# Patient Record
Sex: FEMALE | Race: BLACK OR AFRICAN AMERICAN | Employment: FULL TIME | URBAN - METROPOLITAN AREA
[De-identification: names, ages, dates, MRNs, and addresses within clinical notes are randomized per-mention and may not be internally consistent; named-entity substitution may affect disease eponyms.]

---

## 2017-05-01 ENCOUNTER — OFFICE VISIT (OUTPATIENT)
Dept: OBGYN | Facility: CLINIC | Age: 32
End: 2017-05-01
Payer: COMMERCIAL

## 2017-05-01 VITALS
WEIGHT: 144.7 LBS | DIASTOLIC BLOOD PRESSURE: 76 MMHG | OXYGEN SATURATION: 99 % | HEIGHT: 65 IN | HEART RATE: 78 BPM | BODY MASS INDEX: 24.11 KG/M2 | SYSTOLIC BLOOD PRESSURE: 110 MMHG

## 2017-05-01 DIAGNOSIS — N83.9 PROBLEMS WITH OVULATION: ICD-10-CM

## 2017-05-01 DIAGNOSIS — Z31.69 GENERAL COUNSELING AND ADVICE FOR PROCREATIVE MANAGEMENT: Primary | ICD-10-CM

## 2017-05-01 DIAGNOSIS — Z12.4 SCREENING FOR MALIGNANT NEOPLASM OF CERVIX: ICD-10-CM

## 2017-05-01 PROCEDURE — G0145 SCR C/V CYTO,THINLAYER,RESCR: HCPCS | Performed by: OBSTETRICS & GYNECOLOGY

## 2017-05-01 PROCEDURE — G0476 HPV COMBO ASSAY CA SCREEN: HCPCS | Performed by: OBSTETRICS & GYNECOLOGY

## 2017-05-01 PROCEDURE — 99204 OFFICE O/P NEW MOD 45 MIN: CPT | Performed by: OBSTETRICS & GYNECOLOGY

## 2017-05-01 RX ORDER — ALBUTEROL SULFATE 90 UG/1
2 AEROSOL, METERED RESPIRATORY (INHALATION)
COMMUNITY
Start: 2016-01-02 | End: 2019-08-26

## 2017-05-01 RX ORDER — DIPHENHYDRAMINE HCL 25 MG
25 CAPSULE ORAL
COMMUNITY
Start: 2016-01-02

## 2017-05-01 NOTE — PATIENT INSTRUCTIONS
- Please contact the clinic on the first day of your next period. You will need to return to obtain labs on day 3 of your period.   - Please keep a diary of the first day of each period and the timing of any intercourse  - Plan to have intercourse every other day from day 10-20 of your cycle if possible  - Start taking prenatal vitamins now    - your  needs to see a Urologist for semen analysis and evaluation of erectile dysfunction (inability to achieve erection) and his primary care doctor to evaluate his general health    You can call [place] at [phone] or [place] at [phone].       You may use the following products to ease constipation:    1. Stool softeners such as metamucil or benefiber  2. senna 1-2 times daily  3. Fiber supplements  4. miralax (over the counter).  5. Dulcolax    Please be sure to keep adequately hydrated; 6-8 8oz glasses daily, more if needed to compensate for exercise, sweating, etc.      More specific dosing can be found below:    - Metamucil 28g daily PO with 8oz of water  - senna-docusate (SENOKOT-S;PERICOLACE) 8.6-50 MG per tablet PO 1 tablet, Oral, 2 TIMES DAILY, Start with 1 tablet PO BID, reduce to 1 tablet daily when having daily BMs. Stop for loose stools.  - docusate sodium (COLACE) capsule 100 mg, Oral, 2 TIMES DAILY, To prevent constipation. Hold for loose stools.  - bisacodyl (DULCOLAX) suppository 10 mg, Rectal, DAILY PRN, constipation, Hold for loose stools.

## 2017-05-01 NOTE — PROGRESS NOTES
"SUBJECTIVE:                                                   Becky Mock is a 32 year old  female who presents to clinic today for fertility discussion. Becky has been  since  but living apart from her  until 2015 at which point they both arrived in the [place]. She is interested in conception. She and her  have very little intercourse, roughly every 3 months. He has diabetes and she reports he is unable to obtain an erection.     - Patient's last menstrual period was 2017 (approximate).  Periods last 2-4 days.  unknown  - She reports \"regular periods\" but states they come every 2-4 weeks.  - Uses 2-3 pads on heavy days, 1 pad on light days  - Cyclic symptoms: breast tenderness, moodiness, back pain, uterine cramping.    Problem list and histories reviewed & adjusted, as indicated.  Additional history: as documented.    ROS:  Const: feels like she has lost some weight recently, has not been weighing herself, no fever, chills  : no dysuria, hematuria, urinary frequency, urgency, hesitancy  GI: + constipation, up to 2 weeks without a bowel movement, no diarrhea, abdominal pain  Breast: no nipple discharge, skin changes, lumps  Heme: no history blood clots or easy bruising/bleeding  Neuro: occasional headaches, no Hx migraine, no aura  Endo: + heat or cold intolerance and fatigue  Psych: mood stable, + anxiety, depression  CV: no chest pain, palpitations  Pulm: rare shortness of breath, chest tightness, cough, wheeze    Patient Active Problem List   Diagnosis     Encounter for routine gynecological examination ? Past records??     Contraceptive surveillance ??     Past Surgical History:   Procedure Laterality Date     NO HISTORY OF SURGERY        Social History   Substance Use Topics     Smoking status: Never Smoker     Smokeless tobacco: Not on file     Alcohol use No      Problem (# of Occurrences) Relation (Name,Age of Onset)    Asthma (2) Sister, Son       Negative family " "history of: DIABETES, Coronary Artery Disease              No current outpatient prescriptions on file prior to visit.  No current facility-administered medications on file prior to visit.   No Known Allergies    OBJECTIVE:   /76  Pulse 78  Ht 5' 5\" (1.651 m)  Wt 144 lb 11.2 oz (65.6 kg)  LMP 2017 (Approximate)  SpO2 99%  BMI 24.08 kg/m2   Gen: sitting in chair in no acute distress, comfortable  HENT: no scleral icterus, thyroid normal size  CV: regular rate, well perfused  Pulm: no increased work of breathing, no cough  Skin: warm and dry, no rashes/lesions  Psych: mood stable, appropriate affect  Neuro: A+Ox3   : External genitalia normal well-estrogenized, healthy tissue.  No obvious excoriations, lesions, or rashes. Bartholins, urethra, normal.  Normal moist pink vaginal mucosa.  SSE: Normal cervix, normal physiologic discharge.   Bimanual: No CMT, small mobile anteverted uterus. No adnexal masses or tenderness appreciated.     ASSESSMENT/PLAN:                                                    Becky Mock is a 32 year old female  who desires fertility evaluation as she has not gotten pregnant since being in the same physical location with her  in 1.5 years. However, they have very rare intercourse due to health issues of his. She cannot recall the regularity of her menses.    1. General counseling and advice for procreative management  - Medical history, medications reviewed. Recommend increased exercise, healthy eating; effect of weight on ovulation, conception, healthy pregnancy discussed. Discussed timed intercourse. Recommend follow-up for infertility evaluation if no conception after 12 mths of unprotected intercourse, or persistent irregular menses prior to that time.  - Recommend primary care and urologic evaluation as well as semen analysis for  prior to proceeding with further female factor infertility work up    2. Screening for malignant neoplasm of cervix  - Pap " imaged thin layer screen with HPV - recommended age 30 - 65 years (select HPV order below)  - HPV High Risk Types DNA Cervical    3. Problems with ovulation  - Ovulatory function uncertain due to poor historical memory. Plan day 3 labs as follows:  - Follicle stimulating hormone; Future  - Estradiol; Future  - TSH with free T4 reflex; Future     Return in 3 months with menstrual diary and review labs at that time.  needs evaluation in that time period.     Marilou Briceño MD  Obstetrics and Gynecology   [facility]

## 2017-05-01 NOTE — NURSING NOTE
"Chief Complaint   Patient presents with     Fertility       Initial /76  Pulse 78  Ht 5' 5\" (1.651 m)  Wt 144 lb 11.2 oz (65.6 kg)  LMP 2017 (Approximate)  SpO2 99%  BMI 24.08 kg/m2 Estimated body mass index is 24.08 kg/(m^2) as calculated from the following:    Height as of this encounter: 5' 5\" (1.651 m).    Weight as of this encounter: 144 lb 11.2 oz (65.6 kg).  BP completed using cuff size: regular        The following HM Due: NONE      The following patient reported/Care Every where data was sent to:  P ABSTRACT QUALITY INITIATIVES [[id number]]       Pt would like to start a family.       Pap    History of normal pap      Pam Dykes CMA     "

## 2017-05-01 NOTE — MR AVS SNAPSHOT
After Visit Summary   5/1/2017    Becky Mock    MRN: [id number]           Patient Information     Date Of Birth          1985        Visit Information        Provider Department      5/1/2017 3:00 PM Marilou Briceño MD; [employer] [facility]        Today's Diagnoses     Screening for malignant neoplasm of cervix    -  1      Care Instructions    - Please contact the clinic on the first day of your next period. You will need to return to obtain labs on day 3 of your period.   - Please keep a diary of the first day of each period and the timing of any intercourse  - Plan to have intercourse every other day from day 10-20 of your cycle if possible  - Start taking prenatal vitamins now    - your  needs to see a Urologist for semen analysis and evaluation of erectile dysfunction (inability to achieve erection) and his primary care doctor to evaluate his general health    You can call [place] at [phone] or [place] at [phone].       You may use the following products to ease constipation:    1. Stool softeners such as metamucil or benefiber  2. senna 1-2 times daily  3. Fiber supplements  4. miralax (over the counter).  5. Dulcolax    Please be sure to keep adequately hydrated; 6-8 8oz glasses daily, more if needed to compensate for exercise, sweating, etc.      More specific dosing can be found below:    - Metamucil 28g daily PO with 8oz of water  - senna-docusate (SENOKOT-S;PERICOLACE) 8.6-50 MG per tablet PO 1 tablet, Oral, 2 TIMES DAILY, Start with 1 tablet PO BID, reduce to 1 tablet daily when having daily BMs. Stop for loose stools.  - docusate sodium (COLACE) capsule 100 mg, Oral, 2 TIMES DAILY, To prevent constipation. Hold for loose stools.  - bisacodyl (DULCOLAX) suppository 10 mg, Rectal, DAILY PRN, constipation, Hold for loose stools.           Follow-ups after your visit        Who to contact     If you have questions or  "need follow up information about today's clinic visit or your schedule please contact [facility] directly at [phone].  Normal or non-critical lab and imaging results will be communicated to you by MyChart, letter or phone within 4 business days after the clinic has received the results. If you do not hear from us within 7 days, please contact the clinic through [employer] or phone. If you have a critical or abnormal lab result, we will notify you by phone as soon as possible.  Submit refill requests through [employer] or call your pharmacy and they will forward the refill request to us. Please allow 3 business days for your refill to be completed.          Additional Information About Your Visit        [employer] Information     [employer] lets you send messages to your doctor, view your test results, renew your prescriptions, schedule appointments and more. To sign up, go to [email or web address] . Click on \"Log in\" on the left side of the screen, which will take you to the Welcome page. Then click on \"Sign up Now\" on the right side of the page.     You will be asked to enter the access code listed below, as well as some personal information. Please follow the directions to create your username and password.     Your access code is: [email or web address]  Expires: 2017  4:06 PM     Your access code will  in 90 days. If you need help or a new code, please call your [place] clinic or [phone].        Care EveryWhere ID     This is your Care EveryWhere ID. This could be used by other organizations to access your [place] medical records  [id number]        Your Vitals Were     Pulse Height Last Period Pulse Oximetry BMI (Body Mass Index)       78 5' 5\" (1.651 m) 2017 (Approximate) 99% 24.08 kg/m2        Blood Pressure from Last 3 Encounters:   17 110/76    Weight from Last 3 Encounters:   17 144 lb 11.2 oz (65.6 kg)              We Performed the Following     HPV High Risk " Types DNA Cervical     Pap imaged thin layer screen with HPV - recommended age 30 - 65 years (select HPV order below)        Primary Care Provider Office Phone # Fax #    R[facility]e [facility] [phone]       [address] [address]        Thank you!     Thank you for choosing [facility]  for your care. Our goal is always to provide you with excellent care. Hearing back from our patients is one way we can continue to improve our services. Please take a few minutes to complete the written survey that you may receive in the mail after your visit with us. Thank you!             Your Updated Medication List - Protect others around you: Learn how to safely use, store and throw away your medicines at www.disposemymeds.org.          This list is accurate as of: 5/1/17  4:10 PM.  Always use your most recent med list.                   Brand Name Dispense Instructions for use    albuterol 108 (90 BASE) MCG/ACT Inhaler    PROAIR HFA/PROVENTIL HFA/VENTOLIN HFA     Inhale 2 puffs into the lungs       diphenhydrAMINE 25 MG capsule    BENADRYL     Take 25 mg by mouth

## 2017-05-01 NOTE — LETTER
May 9, 2017    Becky Mock  [address]   [place] [address]    Dear Becky,  We are happy to inform you that your PAP smear result from 5/1/17 is normal.  We are now able to do a follow up test on PAP smears. The DNA test is for HPV (Human Papilloma Virus). Cervical cancer is closely linked with certain types of HPV. Your result showed no evidence of high risk HPV.  Therefore we recommend you return in 3 years for your next pap smear and HPV test.  You will still need to return to the clinic every year for an annual exam and other preventive tests.  Please contact the clinic at [phone] with any questions.  Sincerely,    Marilou Briceño MD/grecia

## 2017-05-05 LAB
COPATH REPORT: NORMAL
PAP: NORMAL

## 2017-05-08 LAB
FINAL DIAGNOSIS: NORMAL
HPV HR 12 DNA CVX QL NAA+PROBE: NEGATIVE
HPV16 DNA SPEC QL NAA+PROBE: NEGATIVE
HPV18 DNA SPEC QL NAA+PROBE: NEGATIVE
SPECIMEN DESCRIPTION: NORMAL

## 2017-05-18 ENCOUNTER — TELEPHONE (OUTPATIENT)
Dept: OBGYN | Facility: CLINIC | Age: 32
End: 2017-05-18

## 2017-05-18 NOTE — TELEPHONE ENCOUNTER
Pt  calls and states that tomorrow May 19, will be day 3 of patients period.  Pt is to have labs done per note from Dr Briceño.  Scheduled pt.  Fabiola Lieberman R.N.

## 2017-05-19 DIAGNOSIS — N83.9 PROBLEMS WITH OVULATION: ICD-10-CM

## 2017-05-19 LAB
ESTRADIOL SERPL-MCNC: 39 PG/ML
FSH SERPL-ACNC: 3.2 IU/L
TSH SERPL DL<=0.005 MIU/L-ACNC: 1.63 MU/L (ref 0.4–4)

## 2017-05-19 PROCEDURE — 36415 COLL VENOUS BLD VENIPUNCTURE: CPT | Performed by: OBSTETRICS & GYNECOLOGY

## 2017-05-19 PROCEDURE — 83001 ASSAY OF GONADOTROPIN (FSH): CPT | Performed by: OBSTETRICS & GYNECOLOGY

## 2017-05-19 PROCEDURE — 84443 ASSAY THYROID STIM HORMONE: CPT | Performed by: OBSTETRICS & GYNECOLOGY

## 2017-05-19 PROCEDURE — 82670 ASSAY OF TOTAL ESTRADIOL: CPT | Performed by: OBSTETRICS & GYNECOLOGY

## 2017-07-11 ENCOUNTER — OFFICE VISIT (OUTPATIENT)
Dept: INTERNAL MEDICINE | Facility: CLINIC | Age: 32
End: 2017-07-11
Payer: COMMERCIAL

## 2017-07-11 VITALS
HEART RATE: 83 BPM | SYSTOLIC BLOOD PRESSURE: 90 MMHG | WEIGHT: 146.5 LBS | DIASTOLIC BLOOD PRESSURE: 60 MMHG | RESPIRATION RATE: 18 BRPM | TEMPERATURE: 98.3 F | OXYGEN SATURATION: 98 % | BODY MASS INDEX: 24.38 KG/M2

## 2017-07-11 DIAGNOSIS — R53.83 FATIGUE, UNSPECIFIED TYPE: Primary | ICD-10-CM

## 2017-07-11 DIAGNOSIS — Z01.84 IMMUNITY STATUS TESTING: ICD-10-CM

## 2017-07-11 DIAGNOSIS — K59.00 CONSTIPATION, UNSPECIFIED CONSTIPATION TYPE: ICD-10-CM

## 2017-07-11 LAB
ERYTHROCYTE [DISTWIDTH] IN BLOOD BY AUTOMATED COUNT: 14.5 % (ref 10–15)
HCT VFR BLD AUTO: 39.2 % (ref 35–47)
HGB BLD-MCNC: 12.5 G/DL (ref 11.7–15.7)
MCH RBC QN AUTO: 30.1 PG (ref 26.5–33)
MCHC RBC AUTO-ENTMCNC: 31.9 G/DL (ref 31.5–36.5)
MCV RBC AUTO: 95 FL (ref 78–100)
PLATELET # BLD AUTO: 319 10E9/L (ref 150–450)
RBC # BLD AUTO: 4.15 10E12/L (ref 3.8–5.2)
WBC # BLD AUTO: 4.5 10E9/L (ref 4–11)

## 2017-07-11 PROCEDURE — 82728 ASSAY OF FERRITIN: CPT | Performed by: INTERNAL MEDICINE

## 2017-07-11 PROCEDURE — 99000 SPECIMEN HANDLING OFFICE-LAB: CPT | Performed by: INTERNAL MEDICINE

## 2017-07-11 PROCEDURE — 82180 ASSAY OF ASCORBIC ACID: CPT | Mod: 90 | Performed by: INTERNAL MEDICINE

## 2017-07-11 PROCEDURE — 86706 HEP B SURFACE ANTIBODY: CPT | Performed by: INTERNAL MEDICINE

## 2017-07-11 PROCEDURE — 85027 COMPLETE CBC AUTOMATED: CPT | Performed by: INTERNAL MEDICINE

## 2017-07-11 PROCEDURE — 83550 IRON BINDING TEST: CPT | Performed by: INTERNAL MEDICINE

## 2017-07-11 PROCEDURE — 86708 HEPATITIS A ANTIBODY: CPT | Performed by: INTERNAL MEDICINE

## 2017-07-11 PROCEDURE — 84439 ASSAY OF FREE THYROXINE: CPT | Performed by: INTERNAL MEDICINE

## 2017-07-11 PROCEDURE — 86704 HEP B CORE ANTIBODY TOTAL: CPT | Performed by: INTERNAL MEDICINE

## 2017-07-11 PROCEDURE — 83540 ASSAY OF IRON: CPT | Performed by: INTERNAL MEDICINE

## 2017-07-11 PROCEDURE — 84425 ASSAY OF VITAMIN B-1: CPT | Mod: 90 | Performed by: INTERNAL MEDICINE

## 2017-07-11 PROCEDURE — 82306 VITAMIN D 25 HYDROXY: CPT | Performed by: INTERNAL MEDICINE

## 2017-07-11 PROCEDURE — 83735 ASSAY OF MAGNESIUM: CPT | Performed by: INTERNAL MEDICINE

## 2017-07-11 PROCEDURE — 82746 ASSAY OF FOLIC ACID SERUM: CPT | Performed by: INTERNAL MEDICINE

## 2017-07-11 PROCEDURE — 86803 HEPATITIS C AB TEST: CPT | Performed by: INTERNAL MEDICINE

## 2017-07-11 PROCEDURE — 99204 OFFICE O/P NEW MOD 45 MIN: CPT | Performed by: INTERNAL MEDICINE

## 2017-07-11 PROCEDURE — 36415 COLL VENOUS BLD VENIPUNCTURE: CPT | Performed by: INTERNAL MEDICINE

## 2017-07-11 PROCEDURE — 84207 ASSAY OF VITAMIN B-6: CPT | Mod: 90 | Performed by: INTERNAL MEDICINE

## 2017-07-11 PROCEDURE — 84443 ASSAY THYROID STIM HORMONE: CPT | Performed by: INTERNAL MEDICINE

## 2017-07-11 PROCEDURE — 82607 VITAMIN B-12: CPT | Performed by: INTERNAL MEDICINE

## 2017-07-11 PROCEDURE — 80053 COMPREHEN METABOLIC PANEL: CPT | Performed by: INTERNAL MEDICINE

## 2017-07-11 PROCEDURE — 87340 HEPATITIS B SURFACE AG IA: CPT | Performed by: INTERNAL MEDICINE

## 2017-07-11 RX ORDER — POLYETHYLENE GLYCOL 3350 17 G/17G
1 POWDER, FOR SOLUTION ORAL DAILY
Qty: 1 BOTTLE | Status: SHIPPED | OUTPATIENT
Start: 2017-07-11 | End: 2017-10-24

## 2017-07-11 RX ORDER — MAGNESIUM CARB/ALUMINUM HYDROX 105-160MG
148 TABLET,CHEWABLE ORAL ONCE
Qty: 148 ML | Refills: 1 | Status: SHIPPED | OUTPATIENT
Start: 2017-07-11 | End: 2017-07-11

## 2017-07-11 NOTE — PATIENT INSTRUCTIONS
** FOLLOW UP PLAN**:    PLEASE SCHEDULE OFFICE VISIT SOONEST AVAILABILITY FROM TODAY TO FOLLOW UP ON  Fatigue, unspecified type  Constipation, unspecified constipation type    YOU MAY CONTACT THE CLINIC IF ANY QUESTIONS OR CONCERNS -[phone] OR VIA [employer]       Constipation (Adult)  Constipation means that you have bowel movements that are less frequent than usual. Stools often become very hard and difficult to pass.  Constipation is very common. At some point in life it affects almost everyone. Since everyone's bowel habits are different, what is constipation to one person may not be to another. Your healthcare provider may do tests to diagnose constipation. It depends on what he or she finds when evaluating you.    Symptoms of constipation include:    Abdominal pain    Bloating    Vomiting    Painful bowel movements    Itching, swelling, bleeding, or pain around the anus  Causes  Constipation can have many causes. These include:    Diet low in fiber    Too much dairy    Not drinking enough liquids    Lack of exercise or physical activity. This is especially true for older adults.    Changes in lifestyle or daily routine, including pregnancy, aging, work, and travel    Frequent use or misuse of laxatives    Ignoring the urge to have a bowel movement or delaying it until later    Medicines, such as certain prescription pain medicines, iron supplements, antacids, certain antidepressants, and calcium supplements    Diseases like irritable bowel syndrome, bowel obstructions, stroke, diabetes, thyroid disease, Parkinson disease, hemorrhoids, and colon cancer  Complications  Potential complications of constipation can include:    Hemorrhoids    Rectal bleeding from hemorrhoids or anal fissures (skin tears)    Hernias    Dependency on laxatives    Chronic constipation    Fecal impaction    Bowel obstruction or perforation  Home care  All treatment should be done after talking with your healthcare provider. This  is especially true if you have another medical problems, are taking prescription medicines, or are an older adult. Treatment most often involves lifestyle changes. You may also need medicines. Your healthcare provider will tell you which will work best for you. Follow the advice below to help avoid this problem in the future.  Lifestyle changes  These lifestyle changes can help prevent constipation:    Diet. Eat a high-fiber diet, with fresh fruit and vegetables, and reduce dairy intake, meats, and processed foods    Fluids. It's important to get enough fluids each day. Drink plenty of water when you eat more fiber. If you are on diet that limits the amount of fluid you can have, talk about this with your healthcare provider.    Regular exercise. Check with your healthcare provider first.  Medications  Take any medicines as directed. Some laxatives are safe to use only every now and then. Others can be taken on a regular basis. Talk with your doctor or pharmacist if you have questions.  Prescription pain medicines can cause constipation. If you are taking this kind of medicine, ask your healthcare provider if you should also take a stool softener.  Medicines you may take to treat constipation include:    Fiber supplements    Stool softeners    Laxatives    Enemas    Rectal suppositories  Follow-up care  Follow up with your healthcare provider if symptoms don't get better in the next few days. You may need to have more tests or see a specialist.  Call 911  Call 911 if any of these occur:    Trouble breathing    Stiff, rigid abdomen that is severely painful to touch    Confusion    Fainting or loss of consciousness    Rapid heart rate    Chest pain  When to seek medical advice  Call your healthcare provider right away if any of these occur:    Fever over 100.4 F (38 C)    Failure to resume normal bowel movements    Pain in your abdomen or back gets worse    Nausea or vomiting    Swelling in your abdomen    Blood in  the stool    Black, tarry stool    Involuntary weight loss    Weakness  Date Last Reviewed: 12/30/2015 2000-2017 [employer]. [address] [place] [address], [place] [address]. All rights reserved. This information is not intended as a substitute for professional medical care. Always follow your healthcare professional's instructions.

## 2017-07-11 NOTE — PROGRESS NOTES
SUBJECTIVE:                                                    Becky Mock is a 32 year old female who presents to clinic today for the following health issues:      New Patient/Transfer of Care  Constipation      Duration: 10 years    Description:       Frequency of bowel movements: 1 week       Consistency of stool:  hard    Intensity:  moderate    Accompanying signs and symptoms: no       Abdominal pain: YES       Rectal pain: no        Blood in stool: no        Nausea/vomitting: YES    History:        Similar problems in past: no     Precipitating or alleviating factors: none       Medications worsening symptoms: no     Therapies tried and outcome: Laxatives       Chronic laxative use: YES      Becky has had issues with moderate fatigue. This non-specific and is not associated with fevers, chills, night sweats, weight loss, chest pain, SOB or a focal source for her symptoms.  Becky  also denies recent history of anemia, thyroid instability or depression.  There has been no evidence of medication interaction or substance abuse.  This has been ongoing for 1-2 years, and workup so far has been inconclusive.    Of note Becky is at risk for Vitamin D and other Vitamin and or mineral deficiencies.      Problem list and histories reviewed & adjusted, as indicated.  Additional history: as documented    Labs reviewed in EPIC    Reviewed and updated as needed this visit by clinical staff  Tobacco  Meds  Med Hx  Surg Hx  Fam Hx  Soc Hx      Reviewed and updated as needed this visit by Provider         ROS:  14 point ROS negative except as above        OBJECTIVE:     BP 90/60  Pulse 83  Temp 98.3  F (36.8  C) (Oral)  Resp 18  Wt 146 lb 8 oz (66.5 kg)  LMP 06/24/2017  SpO2 98%  BMI 24.38 kg/m2  Body mass index is 24.38 kg/(m^2).  GENERAL: healthy, alert and no distress  EYES: Eyes grossly normal to inspection, PERRL and conjunctivae and sclerae normal  NECK: no adenopathy, no asymmetry, masses, or scars and thyroid  normal to palpation  RESP: lungs clear to auscultation - no rales, rhonchi or wheezes  CV: regular rate and rhythm, normal S1 S2, no S3 or S4, no murmur, click or rub, no peripheral edema and peripheral pulses strong  ABDOMEN: tenderness genralized and bowel sounds normal  MS: no gross musculoskeletal defects noted, no edema    Diagnostic Test Results:  none     ASSESSMENT/PLAN:     DIAGNOSIS/PLAN:     ICD-10-CM    1. Fatigue, unspecified type R53.83 Vitamin D Deficiency     Vitamin C     Vitamin B12     Vitamin B1 whole blood     TSH     T4 FREE     Magnesium     Iron and iron binding capacity     Ferritin     Comprehensive metabolic panel     Folate     CBC with platelets     Vitamin B6     Hepatitis B surface antigen     cholecalciferol 5000 UNITS CAPS     Calcium Carb-Cholecalciferol (CALCIUM 1000 + D) 1000-800 MG-UNIT TABS   2. Constipation, unspecified constipation type K59.00 TSH     T4 FREE     Magnesium     magnesium citrate 1.745 GM/30ML solution     polyethylene glycol (MIRALAX) powder   3. Immunity status testing Z01.84 Hepatitis A Antibody IgG     Hepatitis B core antibody     Hepatitis B Surface Antibody     Hepatitis C Screen Reflex to HCV RNA Quant and Genotype       SIGNIFICANT ISSUES RE The primary encounter diagnosis was Fatigue, unspecified type. Diagnoses of Constipation, unspecified constipation type and Immunity status testing were also pertinent to this visit. AS NOTED AND ADDRESSED ABOVE   MEDS AND AND LABS AS ORDERED TO ADDRESS PREVIOUS AND CURRENT ABNORMAL INDICES    SEE PATIENT INSTRUCTION SECTION FOR FOLLOW UP PLAN    Becky IS TO CONTINUE OTHER TREATMENT REGIMEN/PLANS EXCEPT AS INDICATED    COMPLIANCE WITH MEDICATIONS DIET AND EXERCISE PLANS ENCOURAGED    DISCONTINUED MEDS:  There are no discontinued medications.    CURRENT MED LIST WITH CHANGES AS NOTED BELOW:  Current Outpatient Prescriptions   Medication Sig Dispense Refill     magnesium citrate 1.745 GM/30ML solution Take 148 mLs by  mouth once for 1 dose MAY REPEAT IF NO BOWEL MOVEMENT IN 2 HOURS, INDICATION: CONSTIPATION 148 mL 1     polyethylene glycol (MIRALAX) powder Take 17 g (1 capful) by mouth daily INDICATION: CONSTIPATION, TO ACHIEVE 1-2 SOFT BMs PER DAY 1 Bottle PRN     cholecalciferol 5000 UNITS CAPS Take 1 capsule (5,000 Units) by mouth daily FOR VITAMIN D DEFICIENCY (LOW VITAMIN D),TAKE 2 CAPSULES DAILY FOR THE FIRST 4 WEEKS, THEN 1 CAPSULE DAILY 100 capsule 3     Calcium Carb-Cholecalciferol (CALCIUM 1000 + D) 1000-800 MG-UNIT TABS Take 1 tablet by mouth daily TAKE WITH FOOD, FOR BONE HEALTH AND FOR VITAMIN D SUPPLEMENTATION 100 tablet PRN     diphenhydrAMINE (BENADRYL) 25 MG capsule Take 25 mg by mouth       albuterol (PROAIR HFA/PROVENTIL HFA/VENTOLIN HFA) 108 (90 BASE) MCG/ACT Inhaler Inhale 2 puffs into the lungs           Office visit time > 40 mins, greater than 50% of which was spent obtaining history, reviewing medications, counseling re compliance with treatment plan, discussion of treatment, follow up plans, and coordination of care.       Patient Instructions     ** FOLLOW UP PLAN**:    PLEASE SCHEDULE OFFICE VISIT SOONEST AVAILABILITY FROM TODAY TO FOLLOW UP ON  Fatigue, unspecified type  Constipation, unspecified constipation type    YOU MAY CONTACT THE CLINIC IF ANY QUESTIONS OR CONCERNS -[phone] OR VIA [employer]       Constipation (Adult)  Constipation means that you have bowel movements that are less frequent than usual. Stools often become very hard and difficult to pass.  Constipation is very common. At some point in life it affects almost everyone. Since everyone's bowel habits are different, what is constipation to one person may not be to another. Your healthcare provider may do tests to diagnose constipation. It depends on what he or she finds when evaluating you.    Symptoms of constipation include:    Abdominal pain    Bloating    Vomiting    Painful bowel movements    Itching, swelling, bleeding, or  pain around the anus  Causes  Constipation can have many causes. These include:    Diet low in fiber    Too much dairy    Not drinking enough liquids    Lack of exercise or physical activity. This is especially true for older adults.    Changes in lifestyle or daily routine, including pregnancy, aging, work, and travel    Frequent use or misuse of laxatives    Ignoring the urge to have a bowel movement or delaying it until later    Medicines, such as certain prescription pain medicines, iron supplements, antacids, certain antidepressants, and calcium supplements    Diseases like irritable bowel syndrome, bowel obstructions, stroke, diabetes, thyroid disease, Parkinson disease, hemorrhoids, and colon cancer  Complications  Potential complications of constipation can include:    Hemorrhoids    Rectal bleeding from hemorrhoids or anal fissures (skin tears)    Hernias    Dependency on laxatives    Chronic constipation    Fecal impaction    Bowel obstruction or perforation  Home care  All treatment should be done after talking with your healthcare provider. This is especially true if you have another medical problems, are taking prescription medicines, or are an older adult. Treatment most often involves lifestyle changes. You may also need medicines. Your healthcare provider will tell you which will work best for you. Follow the advice below to help avoid this problem in the future.  Lifestyle changes  These lifestyle changes can help prevent constipation:    Diet. Eat a high-fiber diet, with fresh fruit and vegetables, and reduce dairy intake, meats, and processed foods    Fluids. It's important to get enough fluids each day. Drink plenty of water when you eat more fiber. If you are on diet that limits the amount of fluid you can have, talk about this with your healthcare provider.    Regular exercise. Check with your healthcare provider first.  Medications  Take any medicines as directed. Some laxatives are safe to  use only every now and then. Others can be taken on a regular basis. Talk with your doctor or pharmacist if you have questions.  Prescription pain medicines can cause constipation. If you are taking this kind of medicine, ask your healthcare provider if you should also take a stool softener.  Medicines you may take to treat constipation include:    Fiber supplements    Stool softeners    Laxatives    Enemas    Rectal suppositories  Follow-up care  Follow up with your healthcare provider if symptoms don't get better in the next few days. You may need to have more tests or see a specialist.  Call 911  Call 911 if any of these occur:    Trouble breathing    Stiff, rigid abdomen that is severely painful to touch    Confusion    Fainting or loss of consciousness    Rapid heart rate    Chest pain  When to seek medical advice  Call your healthcare provider right away if any of these occur:    Fever over 100.4 F (38 C)    Failure to resume normal bowel movements    Pain in your abdomen or back gets worse    Nausea or vomiting    Swelling in your abdomen    Blood in the stool    Black, tarry stool    Involuntary weight loss    Weakness  Date Last Reviewed: 12/30/2015 2000-2017 [employer]. [address] [place] [address], [place] [address]. All rights reserved. This information is not intended as a substitute for professional medical care. Always follow your healthcare professional's instructions.            Brandon Moreno MD  [facility]

## 2017-07-11 NOTE — Clinical Note
Follow up needed regarding test results:  Please assist with these abnormal test results: Patient was to return to clinic to discuss her test results but never made a follow-up appointment.  She is a female of childbearing years with very low folate level.   On review of her records she was recently seen regarding difficulty conceiving and in the light of this I feel that her vitamin deficiencies most especially folate will need to be replaced as she will be at risk for having a child with neural tube defect. Her B1, B6, vitamin D and iron levels are also low. She probably also needs evaluation for celiac sprue so consideration for obtaining celiac sprue serology probably needs to be entertained. I reached out to the family practice physician home she had seen him recently but she wanted me to send the patient a letter which I feel will not accomplish much, Which is why I had requested a return visit. It would be much appreciated if you could take care of this Thank you.

## 2017-07-11 NOTE — MR AVS SNAPSHOT
After Visit Summary   7/11/2017    Becky Mock    MRN: [id number]           Patient Information     Date Of Birth          1985        Visit Information        Provider Department      7/11/2017 2:45 PM Brandon Moreno MD; [employer] [facility]        Today's Diagnoses     Fatigue, unspecified type    -  1    Constipation, unspecified constipation type        Immunity status testing          Care Instructions      ** FOLLOW UP PLAN**:    PLEASE SCHEDULE OFFICE VISIT SOONEST AVAILABILITY FROM TODAY TO FOLLOW UP ON  Fatigue, unspecified type  Constipation, unspecified constipation type    YOU MAY CONTACT THE CLINIC IF ANY QUESTIONS OR CONCERNS -[phone] OR VIA [employer]       Constipation (Adult)  Constipation means that you have bowel movements that are less frequent than usual. Stools often become very hard and difficult to pass.  Constipation is very common. At some point in life it affects almost everyone. Since everyone's bowel habits are different, what is constipation to one person may not be to another. Your healthcare provider may do tests to diagnose constipation. It depends on what he or she finds when evaluating you.    Symptoms of constipation include:    Abdominal pain    Bloating    Vomiting    Painful bowel movements    Itching, swelling, bleeding, or pain around the anus  Causes  Constipation can have many causes. These include:    Diet low in fiber    Too much dairy    Not drinking enough liquids    Lack of exercise or physical activity. This is especially true for older adults.    Changes in lifestyle or daily routine, including pregnancy, aging, work, and travel    Frequent use or misuse of laxatives    Ignoring the urge to have a bowel movement or delaying it until later    Medicines, such as certain prescription pain medicines, iron supplements, antacids, certain antidepressants, and calcium supplements    Diseases like  irritable bowel syndrome, bowel obstructions, stroke, diabetes, thyroid disease, Parkinson disease, hemorrhoids, and colon cancer  Complications  Potential complications of constipation can include:    Hemorrhoids    Rectal bleeding from hemorrhoids or anal fissures (skin tears)    Hernias    Dependency on laxatives    Chronic constipation    Fecal impaction    Bowel obstruction or perforation  Home care  All treatment should be done after talking with your healthcare provider. This is especially true if you have another medical problems, are taking prescription medicines, or are an older adult. Treatment most often involves lifestyle changes. You may also need medicines. Your healthcare provider will tell you which will work best for you. Follow the advice below to help avoid this problem in the future.  Lifestyle changes  These lifestyle changes can help prevent constipation:    Diet. Eat a high-fiber diet, with fresh fruit and vegetables, and reduce dairy intake, meats, and processed foods    Fluids. It's important to get enough fluids each day. Drink plenty of water when you eat more fiber. If you are on diet that limits the amount of fluid you can have, talk about this with your healthcare provider.    Regular exercise. Check with your healthcare provider first.  Medications  Take any medicines as directed. Some laxatives are safe to use only every now and then. Others can be taken on a regular basis. Talk with your doctor or pharmacist if you have questions.  Prescription pain medicines can cause constipation. If you are taking this kind of medicine, ask your healthcare provider if you should also take a stool softener.  Medicines you may take to treat constipation include:    Fiber supplements    Stool softeners    Laxatives    Enemas    Rectal suppositories  Follow-up care  Follow up with your healthcare provider if symptoms don't get better in the next few days. You may need to have more tests or see a  "specialist.  Call 911  Call 911 if any of these occur:    Trouble breathing    Stiff, rigid abdomen that is severely painful to touch    Confusion    Fainting or loss of consciousness    Rapid heart rate    Chest pain  When to seek medical advice  Call your healthcare provider right away if any of these occur:    Fever over 100.4 F (38 C)    Failure to resume normal bowel movements    Pain in your abdomen or back gets worse    Nausea or vomiting    Swelling in your abdomen    Blood in the stool    Black, tarry stool    Involuntary weight loss    Weakness  Date Last Reviewed: 12/30/2015 2000-2017 [employer]. [address] [place] [address]. All rights reserved. This information is not intended as a substitute for professional medical care. Always follow your healthcare professional's instructions.                Follow-ups after your visit        Who to contact     If you have questions or need follow up information about today's clinic visit or your schedule please contact [facility] directly at [phone].  Normal or non-critical lab and imaging results will be communicated to you by [employer], letter or phone within 4 business days after the clinic has received the results. If you do not hear from us within 7 days, please contact the clinic through [employer] or phone. If you have a critical or abnormal lab result, we will notify you by phone as soon as possible.  Submit refill requests through [employer] or call your pharmacy and they will forward the refill request to us. Please allow 3 business days for your refill to be completed.          Additional Information About Your Visit        [employer] Information     [employer] lets you send messages to your doctor, view your test results, renew your prescriptions, schedule appointments and more. To sign up, go to [email or web address] . Click on \"Log in\" on the left side of the screen, which will take you to the Welcome " "page. Then click on \"Sign up Now\" on the right side of the page.     You will be asked to enter the access code listed below, as well as some personal information. Please follow the directions to create your username and password.     Your access code is: [email or web address]  Expires: 2017  4:06 PM     Your access code will  in 90 days. If you need help or a new code, please call your [facility] clinic or [phone].        Care EveryWhere ID     This is your Care EveryWhere ID. This could be used by other organizations to access your [facility] medical records  [id number]        Your Vitals Were     Pulse Temperature Respirations Last Period Pulse Oximetry BMI (Body Mass Index)    83 98.3  F (36.8  C) (Oral) 18 2017 98% 24.38 kg/m2       Blood Pressure from Last 3 Encounters:   17 90/60   17 110/76    Weight from Last 3 Encounters:   17 146 lb 8 oz (66.5 kg)   17 144 lb 11.2 oz (65.6 kg)              We Performed the Following     CBC with platelets     Comprehensive metabolic panel     Ferritin     Folate     Hepatitis A Antibody IgG     Hepatitis B core antibody     Hepatitis B Surface Antibody     Hepatitis B surface antigen     Hepatitis C Screen Reflex to HCV RNA Quant and Genotype     Iron and iron binding capacity     Magnesium     T4 FREE     TSH     Vitamin B1 whole blood     Vitamin B12     Vitamin B6     Vitamin C     Vitamin D Deficiency          Today's Medication Changes          These changes are accurate as of: 17  4:18 PM.  If you have any questions, ask your nurse or doctor.               Start taking these medicines.        Dose/Directions    Calcium Carb-Cholecalciferol 1000-800 MG-UNIT Tabs   Commonly known as:  CALCIUM 1000 + D   Used for:  Fatigue, unspecified type   Started by:  Brandon Moreno MD        Dose:  1 tablet   Take 1 tablet by mouth daily TAKE WITH FOOD, FOR BONE HEALTH AND FOR VITAMIN D SUPPLEMENTATION   Quantity:  100 tablet   Refills:  " PRN       cholecalciferol 5000 UNITS Caps   Used for:  Fatigue, unspecified type   Started by:  Brandon Moreno MD        Dose:  1 capsule   Take 1 capsule (5,000 Units) by mouth daily FOR VITAMIN D DEFICIENCY (LOW VITAMIN D),TAKE 2 CAPSULES DAILY FOR THE FIRST 4 WEEKS, THEN 1 CAPSULE DAILY   Quantity:  100 capsule   Refills:  3       magnesium citrate 1.745 GM/30ML solution   Used for:  Constipation, unspecified constipation type   Started by:  Brandon oMreno MD        Dose:  148 mL   Take 148 mLs by mouth once for 1 dose MAY REPEAT IF NO BOWEL MOVEMENT IN 2 HOURS, INDICATION: CONSTIPATION   Quantity:  148 mL   Refills:  1       polyethylene glycol powder   Commonly known as:  MIRALAX   Used for:  Constipation, unspecified constipation type   Started by:  Brandon Moreno MD        Dose:  1 capful   Take 17 g (1 capful) by mouth daily INDICATION: CONSTIPATION, TO ACHIEVE 1-2 SOFT BMs PER DAY   Quantity:  1 Bottle   Refills:  PRN            Where to get your medicines      These medications were sent to [facility] [address] [address]     Phone:  [phone]     Calcium Carb-Cholecalciferol 1000-800 MG-UNIT Tabs    magnesium citrate 1.745 GM/30ML solution    polyethylene glycol powder         Some of these will need a paper prescription and others can be bought over the counter.  Ask your nurse if you have questions.     Bring a paper prescription for each of these medications     cholecalciferol 5000 UNITS Caps                Primary Care Provider Office Phone # Fax Inga KimDO mariann [phone]       [address] [address]        Equal Access to Services     CLEVE DAMICO AH: Ayanna Aleman, baudilio dixon, qaybjax pate, gio [facility] [phone].    ATENCIÓN: Si habla español, tiene a gordon disposición servicios  yu de asistencia lingüística. Temo witt [phone].    We comply with applicable federal civil rights laws and [place] laws. We do not discriminate on the basis of race, color, national origin, age, disability sex, sexual orientation or gender identity.            Thank you!     Thank you for choosing [facility]  for your care. Our goal is always to provide you with excellent care. Hearing back from our patients is one way we can continue to improve our services. Please take a few minutes to complete the written survey that you may receive in the mail after your visit with us. Thank you!             Your Updated Medication List - Protect others around you: Learn how to safely use, store and throw away your medicines at www.disposemymeds.org.          This list is accurate as of: 7/11/17  4:18 PM.  Always use your most recent med list.                   Brand Name Dispense Instructions for use Diagnosis    albuterol 108 (90 BASE) MCG/ACT Inhaler    PROAIR HFA/PROVENTIL HFA/VENTOLIN HFA     Inhale 2 puffs into the lungs        Calcium Carb-Cholecalciferol 1000-800 MG-UNIT Tabs    CALCIUM 1000 + D    100 tablet    Take 1 tablet by mouth daily TAKE WITH FOOD, FOR BONE HEALTH AND FOR VITAMIN D SUPPLEMENTATION    Fatigue, unspecified type       cholecalciferol 5000 UNITS Caps     100 capsule    Take 1 capsule (5,000 Units) by mouth daily FOR VITAMIN D DEFICIENCY (LOW VITAMIN D),TAKE 2 CAPSULES DAILY FOR THE FIRST 4 WEEKS, THEN 1 CAPSULE DAILY    Fatigue, unspecified type       diphenhydrAMINE 25 MG capsule    BENADRYL     Take 25 mg by mouth        magnesium citrate 1.745 GM/30ML solution     148 mL    Take 148 mLs by mouth once for 1 dose MAY REPEAT IF NO BOWEL MOVEMENT IN 2 HOURS, INDICATION: CONSTIPATION    Constipation, unspecified constipation type       polyethylene glycol powder    MIRALAX    1 Bottle    Take 17 g (1 capful) by mouth daily INDICATION: CONSTIPATION, TO ACHIEVE 1-2  SOFT BMs PER DAY    Constipation, unspecified constipation type

## 2017-07-11 NOTE — NURSING NOTE
"Chief Complaint   Patient presents with     Establish Care       Initial BP 90/60  Pulse 83  Temp 98.3  F (36.8  C) (Oral)  Resp 18  Wt 146 lb 8 oz (66.5 kg)  LMP 06/24/2017  SpO2 98%  BMI 24.38 kg/m2 Estimated body mass index is 24.38 kg/(m^2) as calculated from the following:    Height as of 5/1/17: 5' 5\" (1.651 m).    Weight as of this encounter: 146 lb 8 oz (66.5 kg).  Blood pressure completed using cuff size: regular    "

## 2017-07-12 LAB
ALBUMIN SERPL-MCNC: 3.5 G/DL (ref 3.4–5)
ALP SERPL-CCNC: 69 U/L (ref 40–150)
ALT SERPL W P-5'-P-CCNC: 14 U/L (ref 0–50)
ANION GAP SERPL CALCULATED.3IONS-SCNC: 5 MMOL/L (ref 3–14)
AST SERPL W P-5'-P-CCNC: 10 U/L (ref 0–45)
BILIRUB SERPL-MCNC: 0.2 MG/DL (ref 0.2–1.3)
BUN SERPL-MCNC: 9 MG/DL (ref 7–30)
CALCIUM SERPL-MCNC: 8.9 MG/DL (ref 8.5–10.1)
CHLORIDE SERPL-SCNC: 107 MMOL/L (ref 94–109)
CO2 SERPL-SCNC: 27 MMOL/L (ref 20–32)
CREAT SERPL-MCNC: 0.71 MG/DL (ref 0.52–1.04)
DEPRECATED CALCIDIOL+CALCIFEROL SERPL-MC: 23 UG/L (ref 20–75)
FOLATE SERPL-MCNC: 4.6 NG/ML
GFR SERPL CREATININE-BSD FRML MDRD: NORMAL ML/MIN/1.7M2
GLUCOSE SERPL-MCNC: 89 MG/DL (ref 70–99)
HAV IGG SER QL IA: ABNORMAL
HBV CORE AB SERPL QL IA: NONREACTIVE
HBV SURFACE AB SERPL IA-ACNC: 0.28 M[IU]/ML
HBV SURFACE AG SERPL QL IA: NONREACTIVE
HCV AB SERPL QL IA: NORMAL
MAGNESIUM SERPL-MCNC: 2.3 MG/DL (ref 1.6–2.3)
POTASSIUM SERPL-SCNC: 4.3 MMOL/L (ref 3.4–5.3)
PROT SERPL-MCNC: 7.3 G/DL (ref 6.8–8.8)
SODIUM SERPL-SCNC: 139 MMOL/L (ref 133–144)
T4 FREE SERPL-MCNC: 0.82 NG/DL (ref 0.76–1.46)
TSH SERPL DL<=0.05 MIU/L-ACNC: 1.52 MU/L (ref 0.4–4)
VIT B12 SERPL-MCNC: 570 PG/ML (ref 193–986)

## 2017-07-13 LAB
FERRITIN SERPL-MCNC: 7 NG/ML (ref 12–150)
IRON SATN MFR SERPL: 12 % (ref 15–46)
IRON SERPL-MCNC: 39 UG/DL (ref 35–180)
TIBC SERPL-MCNC: 314 UG/DL (ref 240–430)

## 2017-07-14 LAB — VIT B6 SERPL-MCNC: 16 NG/ML

## 2017-07-15 LAB
VIT B1 BLD-MCNC: 67 UG/DL
VIT C SERPL-MCNC: 39 MG/DL

## 2017-09-22 ENCOUNTER — OFFICE VISIT (OUTPATIENT)
Dept: FAMILY MEDICINE | Facility: CLINIC | Age: 32
End: 2017-09-22

## 2017-09-22 VITALS
HEIGHT: 65 IN | BODY MASS INDEX: 24.89 KG/M2 | WEIGHT: 149.4 LBS | SYSTOLIC BLOOD PRESSURE: 122 MMHG | TEMPERATURE: 97.9 F | HEART RATE: 69 BPM | OXYGEN SATURATION: 100 % | DIASTOLIC BLOOD PRESSURE: 73 MMHG

## 2017-09-22 DIAGNOSIS — N92.6 IRREGULAR MENSTRUAL CYCLE: Primary | ICD-10-CM

## 2017-09-22 NOTE — MR AVS SNAPSHOT
"              After Visit Summary   2017    Becky Mock    MRN: [id number]           Patient Information     Date Of Birth          1985        Visit Information        Provider Department      2017 9:40 AM Neetu Abdi MD [facility]         Follow-ups after your visit        Who to contact     Please call your clinic at [phone] to:    Ask questions about your health    Make or cancel appointments    Discuss your medicines    Learn about your test results    Speak to your doctor   If you have compliments or concerns about an experience at your clinic, or if you wish to file a complaint, please contact [facility] Patient Relations at [phone] or email us at [email or web address]         Additional Information About Your Visit        MyChart Information     Glimpse is an electronic gateway that provides easy, online access to your medical records. With Glimpse, you can request a clinic appointment, read your test results, renew a prescription or communicate with your care team.     To sign up for Wickrt visit the website at [email or web address]   You will be asked to enter the access code listed below, as well as some personal information. Please follow the directions to create your username and password.     Your access code is: [email or web address]  Expires: 2017 10:36 AM     Your access code will  in 90 days. If you need help or a new code, please contact your [facility] or call [phone] for assistance.        Care EveryWhere ID     This is your Care EveryWhere ID. This could be used by other organizations to access your [place] medical records  [id number]        Your Vitals Were     Pulse Temperature Height Last Period Pulse Oximetry BMI (Body Mass Index)    69 97.9  F (36.6  C) (Oral) 5' 4.57\" (164 cm) 2017 (Approximate) 100% 25.2 kg/m2       Blood Pressure from Last 3 " Encounters:   09/22/17 122/73   07/11/17 90/60   05/01/17 110/76    Weight from Last 3 Encounters:   09/22/17 149 lb 6.4 oz (67.8 kg)   07/11/17 146 lb 8 oz (66.5 kg)   05/01/17 144 lb 11.2 oz (65.6 kg)              Today, you had the following     No orders found for display       Primary Care Provider Office Phone # Fax #    Renee Park [phone]       [address] [place] [address] [address] [place] [address]        Equal Access to Services     [facility] Hospital[facility] ANN : Hadii jordana deluna hadasho Soraphael, waaxda luqadaha, qaybta kaalmada herlinda, gio donnelly . So [facility] [phone].    ATENCIÓN: Si habla español, tiene a gordon disposición servicios gratuitos de asistencia lingüística. [facility] [phone].    We comply with applicable federal civil rights laws and [place] laws. We do not discriminate on the basis of race, color, national origin, age, disability sex, sexual orientation or gender identity.            Thank you!     Thank you for choosing [facility]  for your care. Our goal is always to provide you with excellent care. Hearing back from our patients is one way we can continue to improve our services. Please take a few minutes to complete the written survey that you may receive in the mail after your visit with us. Thank you!             Your Updated Medication List - Protect others around you: Learn how to safely use, store and throw away your medicines at www.disposemymeds.org.          This list is accurate as of: 9/22/17 10:36 AM.  Always use your most recent med list.                   Brand Name Dispense Instructions for use Diagnosis    albuterol 108 (90 BASE) MCG/ACT Inhaler    PROAIR HFA/PROVENTIL HFA/VENTOLIN HFA     Inhale 2 puffs into the lungs        Calcium Carb-Cholecalciferol 1000-800 MG-UNIT Tabs    CALCIUM 1000 + D    100 tablet    Take 1 tablet by mouth daily TAKE WITH FOOD, FOR BONE HEALTH AND FOR VITAMIN D SUPPLEMENTATION    Fatigue,  unspecified type       cholecalciferol 5000 UNITS Caps     100 capsule    Take 1 capsule (5,000 Units) by mouth daily FOR VITAMIN D DEFICIENCY (LOW VITAMIN D),TAKE 2 CAPSULES DAILY FOR THE FIRST 4 WEEKS, THEN 1 CAPSULE DAILY    Fatigue, unspecified type       diphenhydrAMINE 25 MG capsule    BENADRYL     Take 25 mg by mouth        polyethylene glycol powder    MIRALAX    1 Bottle    Take 17 g (1 capful) by mouth daily INDICATION: CONSTIPATION, TO ACHIEVE 1-2 SOFT BMs PER DAY    Constipation, unspecified constipation type

## 2017-09-22 NOTE — NURSING NOTE
name: Kate PLATT  Language: Montenegrin   Agency: [facility]   Phone number: [phone]    Lavonne Garcia

## 2017-09-23 PROBLEM — N92.6 IRREGULAR MENSTRUAL CYCLE: Status: ACTIVE | Noted: 2017-09-23

## 2017-09-23 NOTE — PROGRESS NOTES
SUBJECTIVE:  The patient is here to talk about trying to get pregnant.  She actually had seen OB about this, and they had quite a detailed note.  There are actually 2 main issues involved.  One is she has irregular periods, and her  has diabetes and is unable to get erections and ejaculations.  However, she says before this happened, they had 2 years where they were able to have regular intercourse, and she did not get pregnant.  She had some initial blood work done, and then they asked her to keep a menstrual diary for a couple months and during this time have her  have a semen analysis and at that point that she should follow up with OB.  We ended up spending some time talking about her  having an evaluation.  It sounds like he has been a little reluctant to do that, but it sounds like he is willing, and then they were trying to sort out how that could happen.  There was some question whether the [facility] office could order that evaluation.  It sounds like maybe they tried, but there was some issue about her 's insurance.  I said we could help set that up, but actually it sounded more like this was not going to be just ordering a simple semen analysis; it sounds like he is going to need to see a urologist.  I do not think we could do a referral for him without ever seeing him.  However, I ended up giving her the names and numbers of 2 urologists, although I said at the [facility], they did have specialists with experience in infertility issues, which sounds like they are going to have to deal with that with the  to some extent.        OBJECTIVE:  On exam, the patient is in no acute distress.  Vital signs are stable.  She did fill out a health history form, like a 20 item review of systems that is all negative, and she really lists no concerns.        IMPRESSION:  The patient wants to get pregnant, but having irregular periods, and there are definitely issues on the 's side.         PLAN:  At this point, I think they may need to get the initial evaluation for the  before proceeding further with the wife.  Again, I offered to try to help as we can.  Again, it sounds like whatever insurance he has is going to go into effect in October, so probably things are a little bit in limbo for the next week or 2.        Visit length was 20 minutes, all spent counseling about these issues.

## 2017-10-24 ENCOUNTER — OFFICE VISIT (OUTPATIENT)
Dept: FAMILY MEDICINE | Facility: CLINIC | Age: 32
End: 2017-10-24

## 2017-10-24 VITALS
SYSTOLIC BLOOD PRESSURE: 101 MMHG | RESPIRATION RATE: 16 BRPM | DIASTOLIC BLOOD PRESSURE: 70 MMHG | HEART RATE: 63 BPM | BODY MASS INDEX: 24.52 KG/M2 | TEMPERATURE: 97.5 F | OXYGEN SATURATION: 100 % | WEIGHT: 145.4 LBS

## 2017-10-24 DIAGNOSIS — Z87.09 HISTORY OF ASTHMA: Primary | ICD-10-CM

## 2017-10-24 DIAGNOSIS — M26.609 TEMPOROMANDIBULAR JOINT DISORDER: ICD-10-CM

## 2017-10-24 DIAGNOSIS — K59.00 CONSTIPATION, UNSPECIFIED CONSTIPATION TYPE: ICD-10-CM

## 2017-10-24 DIAGNOSIS — R10.13 ABDOMINAL PAIN, EPIGASTRIC: ICD-10-CM

## 2017-10-24 RX ORDER — CETIRIZINE HYDROCHLORIDE 10 MG/1
10 TABLET ORAL DAILY
COMMUNITY
End: 2019-10-07

## 2017-10-24 RX ORDER — FLUTICASONE PROPIONATE 50 MCG
1 SPRAY, SUSPENSION (ML) NASAL DAILY
COMMUNITY
End: 2019-07-08

## 2017-10-24 RX ORDER — NEOMYCIN SULFATE, POLYMYXIN B SULFATE, HYDROCORTISONE 3.5; 10000; 1 MG/ML; [USP'U]/ML; MG/ML
3 SOLUTION/ DROPS AURICULAR (OTIC) 4 TIMES DAILY
COMMUNITY

## 2017-10-24 RX ORDER — ASPIRIN 81 MG
100 TABLET, DELAYED RELEASE (ENTERIC COATED) ORAL 2 TIMES DAILY
Qty: 60 TABLET | Refills: 3 | Status: SHIPPED | OUTPATIENT
Start: 2017-10-24

## 2017-10-24 ASSESSMENT — ENCOUNTER SYMPTOMS
VOMITING: 1
COUGH: 1
NAUSEA: 1
SINUS PAIN: 1
SHORTNESS OF BREATH: 0
DYSURIA: 0
BLOOD IN STOOL: 0
CHILLS: 1
CONSTIPATION: 1
ABDOMINAL PAIN: 1
WHEEZING: 0
SORE THROAT: 0
DIZZINESS: 1
EYE ITCHING: 1
FEVER: 1
SINUS PRESSURE: 1

## 2017-10-24 NOTE — MR AVS SNAPSHOT
After Visit Summary   10/24/2017    Becky Mock    MRN: [id number]           Patient Information     Date Of Birth          1985        Visit Information        Provider Department      10/24/2017 2:40 PM Codie Becerril APRN CNP [facility]        Today's Diagnoses     History of asthma    -  1    Temporomandibular joint disorder        Abdominal pain, epigastric        Constipation, unspecified constipation type          Care Instructions    Here is the plan from today's visit    1. History of asthma  - Spirometry Pre/Post (Bronchodilation Response); Future    Schedule breathing test:  [phone] and follow-up in clinic after testing.     2. Temporomandibular joint disorder  - naproxen (NAPROSYN) 375 MG tablet; Take 1 tablet (375 mg) by mouth 2 times daily (with meals)  Dispense: 60 tablet; Refill: 2  Schedule medication for 3-4 days then use as needed.     3. Abdominal pain, epigastric  - H Pylori antigen stool; Future  - ranitidine (ZANTAC) 150 MG tablet; Take 1 tablet (150 mg) by mouth 2 times daily  Dispense: 60 tablet; Refill: 3    4. Constipation, unspecified constipation type  - docusate sodium (COLACE) 100 MG tablet; Take 100 mg by mouth 2 times daily  Dispense: 60 tablet; Refill: 3            Thank you for coming to [facility] today.  Lab Testing:  **If you had lab testing today and your results are reassuring or normal they will be mailed to you or sent through Pumpic within 7 days.   **If the lab tests need quick action we will call you with the results.  The phone number we will call with results is # [phone] (home) . If this is not the best number please call our clinic and change the number.  Medication Refills:  If you need any refills please call your pharmacy and they will contact us.   If you need to  your refill at a new pharmacy, please contact the new pharmacy directly. The new pharmacy will help you get your medications  transferred faster.   Scheduling:  If you have any concerns about today's visit or wish to schedule another appointment please call our office during normal business hours [phone] (8-5:00 M-F)  If a referral was made to a [facility] and you don't get a call from central scheduling please call [phone].  If a Mammogram was ordered for you at [facility] call [phone] to schedule or change your appointment.  If you had an XRay/CT/Ultrasound/MRI ordered the number is [phone] to schedule or change your radiology appointment.   Medical Concerns:  If you have urgent medical concerns please call [phone] at any time of the day.  If you have a medical emergency please call 173.            Follow-ups after your visit        Future tests that were ordered for you today     Open Future Orders        Priority Expected Expires Ordered    H Pylori antigen stool Routine  11/23/2017 10/24/2017    Spirometry Pre/Post (Bronchodilation Response) Routine  12/8/2017 10/24/2017            Who to contact     Please call your clinic at [phone] to:    Ask questions about your health    Make or cancel appointments    Discuss your medicines    Learn about your test results    Speak to your doctor   If you have compliments or concerns about an experience at your clinic, or if you wish to file a complaint, please contact [facility] Patient Relations at [phone] or email us at [email or web address]         Additional Information About Your Visit        MyChart Information     Mirimust is an electronic gateway that provides easy, online access to your medical records. With ServiceMesh, you can request a clinic appointment, read your test results, renew a prescription or communicate with your care team.     To sign up for Mirimust visit the website at [email or web address]   You will be asked to enter the access code listed below, as well as some  personal information. Please follow the directions to create your username and password.     Your access code is: [email or web address]  Expires: 2017 10:36 AM     Your access code will  in 90 days. If you need help or a new code, please contact your [facility] or call [phone] for assistance.        Care EveryWhere ID     This is your Care EveryWhere ID. This could be used by other organizations to access your [place] medical records  [id number]        Your Vitals Were     Pulse Temperature Respirations Pulse Oximetry BMI (Body Mass Index)       63 97.5  F (36.4  C) (Oral) 16 100% 24.52 kg/m2        Blood Pressure from Last 3 Encounters:   10/24/17 101/70   17 122/73   17 90/60    Weight from Last 3 Encounters:   10/24/17 145 lb 6.4 oz (66 kg)   17 149 lb 6.4 oz (67.8 kg)   17 146 lb 8 oz (66.5 kg)                 Today's Medication Changes          These changes are accurate as of: 10/24/17  3:35 PM.  If you have any questions, ask your nurse or doctor.               Start taking these medicines.        Dose/Directions    docusate sodium 100 MG tablet   Commonly known as:  COLACE   Used for:  Constipation, unspecified constipation type   Started by:  Codie Becerril APRN CNP        Dose:  100 mg   Take 100 mg by mouth 2 times daily   Quantity:  60 tablet   Refills:  3       naproxen 375 MG tablet   Commonly known as:  NAPROSYN   Used for:  Temporomandibular joint disorder   Started by:  Codie Becerril APRN CNP        Dose:  375 mg   Take 1 tablet (375 mg) by mouth 2 times daily (with meals)   Quantity:  60 tablet   Refills:  2       ranitidine 150 MG tablet   Commonly known as:  ZANTAC   Used for:  Abdominal pain, epigastric   Started by:  Codie Becerril APRN CNP        Dose:  150 mg   Take 1 tablet (150 mg) by mouth 2 times daily   Quantity:  60 tablet   Refills:  3         Stop taking these medicines if you haven't already.  Please contact your care team if you have questions.     Calcium Carb-Cholecalciferol 1000-800 MG-UNIT Tabs   Commonly known as:  CALCIUM 1000 + D   Stopped by:  Codie Becerril APRN CNP           cholecalciferol 5000 UNITS Caps   Stopped by:  Codie Becerril APRN CNP           polyethylene glycol powder   Commonly known as:  MIRALAX   Stopped by:  Codie Becerril APRN CNP                Where to get your medicines      These medications were sent to [facility] [address] [place] [facility] [address] [facility]  [address] [facility] [address]     Phone:  [phone]     docusate sodium 100 MG tablet    naproxen 375 MG tablet    ranitidine 150 MG tablet                Primary Care Provider Office Phone # Fax #    Renee Park [phone]       [address] [place] [address] [address]  [facility] [address]        Equal Access to Services     DAWIT DAMICO : Hadii aad ku hadasho Soraphael, waaxda luqadaha, qaybta kaalmada herlinda, gio donnelly . So [facility] [phone].    ATENCIÓN: Si habla español, tiene a gordon disposición servicios gratuitos de asistencia lingüística. Temo al [phone].    We comply with applicable federal civil rights laws and [place] laws. We do not discriminate on the basis of race, color, national origin, age, disability, sex, sexual orientation, or gender identity.            Thank you!     Thank you for choosing [facility]  for your care. Our goal is always to provide you with excellent care. Hearing back from our patients is one way we can continue to improve our services. Please take a few minutes to complete the written survey that you may receive in the mail after your visit with us. Thank you!             Your Updated Medication List - Protect others around you: Learn how to safely use, store and throw away your medicines at www.disposemymeds.org.          This list is accurate as of: 10/24/17  3:35 PM.  Always  use your most recent med list.                   Brand Name Dispense Instructions for use Diagnosis    albuterol 108 (90 BASE) MCG/ACT Inhaler    PROAIR HFA/PROVENTIL HFA/VENTOLIN HFA     Inhale 2 puffs into the lungs        AMOXAPINE PO      Take 500 mg by mouth 2 times daily        cetirizine 10 MG tablet    zyrTEC     Take 10 mg by mouth daily        diphenhydrAMINE 25 MG capsule    BENADRYL     Take 25 mg by mouth        docusate sodium 100 MG tablet    COLACE    60 tablet    Take 100 mg by mouth 2 times daily    Constipation, unspecified constipation type       fluticasone 50 MCG/ACT spray    FLONASE     Spray 1 spray into both nostrils daily        naproxen 375 MG tablet    NAPROSYN    60 tablet    Take 1 tablet (375 mg) by mouth 2 times daily (with meals)    Temporomandibular joint disorder       neomycin-polymyxin-hydrocortisone otic solution    CORTISPORIN     Place 3 drops in ear(s) 4 times daily        ranitidine 150 MG tablet    ZANTAC    60 tablet    Take 1 tablet (150 mg) by mouth 2 times daily    Abdominal pain, epigastric

## 2017-10-24 NOTE — PATIENT INSTRUCTIONS
Here is the plan from today's visit    1. History of asthma  - Spirometry Pre/Post (Bronchodilation Response); Future    Schedule breathing test:  [phone] and follow-up in clinic after testing.     2. Temporomandibular joint disorder  - naproxen (NAPROSYN) 375 MG tablet; Take 1 tablet (375 mg) by mouth 2 times daily (with meals)  Dispense: 60 tablet; Refill: 2  Schedule medication for 3-4 days then use as needed.     3. Abdominal pain, epigastric  - H Pylori antigen stool; Future  - ranitidine (ZANTAC) 150 MG tablet; Take 1 tablet (150 mg) by mouth 2 times daily  Dispense: 60 tablet; Refill: 3    4. Constipation, unspecified constipation type  - docusate sodium (COLACE) 100 MG tablet; Take 100 mg by mouth 2 times daily  Dispense: 60 tablet; Refill: 3            Thank you for coming to [facility] today.  Lab Testing:  **If you had lab testing today and your results are reassuring or normal they will be mailed to you or sent through Optini within 7 days.   **If the lab tests need quick action we will call you with the results.  The phone number we will call with results is # [phone] (home) . If this is not the best number please call our clinic and change the number.  Medication Refills:  If you need any refills please call your pharmacy and they will contact us.   If you need to  your refill at a new pharmacy, please contact the new pharmacy directly. The new pharmacy will help you get your medications transferred faster.   Scheduling:  If you have any concerns about today's visit or wish to schedule another appointment please call our office during normal business hours [phone] (8-5:00 M-F)  If a referral was made to a [facility] and you don't get a call from central scheduling please call [phone].  If a Mammogram was ordered for you at [facility] call [phone] to schedule or change your appointment.  If you had an XRay/CT/Ultrasound/MRI ordered the  number is [phone] to schedule or change your radiology appointment.   Medical Concerns:  If you have urgent medical concerns please call [phone] at any time of the day.  If you have a medical emergency please call [phone].

## 2017-10-24 NOTE — PROGRESS NOTES
HPI:       Becky Mock is a 32 year old who presents for the following  Patient presents with:  Headache  Ent Problem: bilateral ear pain   Abdominal Pain        Concern: abdominal pain, headache, ear pain     Description of the problem :   Abdominal pain: burning and emesis with gas for 2 weeks. She vomits once to three times a day. It doesn't seem to be associated with eating. She has been feeling dizzy  and sleeping less. No blood in stool or urine. Denies dysuria. She has problems with constipation and only has a BM every 1-3 weeks which she takes medications for (she has been seen for this in the past.)    Ear pain x2 weeks in both ears with pain down the sides of her neck. She feels pressure in her ears and they itch. She feels facial pressure. Pain is much worse with bending over to put on her shoes. Occasional fever. Headache daily, sometimes keeping her awake at night x3 weeks. She thinks it might be related to allergies. She has been using Neomycin and polymixin B drops TID since October 12th (Dr Alexis prescribed for her). She has been taking daily flonase and zytec as well. She has experienced these symptoms in the past with her allergies. She has mild dizziness that is associated with both sitting and standing. Productive cough but she has had this at baseline for years and has not changed.     She started on amoxicillin 500mg po every 8 hours x 1 week on 10/20/17 after tooth extraction.          A NexDefense  was used for  this visit.    Problem, Medication and Allergy Lists were reviewed and are current.  Patient is an established patient of this clinic.         Review of Systems:   Review of Systems   Constitutional: Positive for chills and fever.   HENT: Positive for congestion, ear pain, sinus pain and sinus pressure. Negative for sore throat.         Recent tooth surgery   Eyes: Positive for itching.   Respiratory: Positive for cough. Negative for shortness of breath and wheezing.     Gastrointestinal: Positive for abdominal pain, constipation, nausea and vomiting. Negative for blood in stool.   Genitourinary: Negative for dysuria.   Neurological: Positive for dizziness.             Physical Exam:   Patient Vitals for the past 24 hrs:   BP Temp Temp src Pulse Resp SpO2 Weight   10/24/17 1451 101/70 97.5  F (36.4  C) Oral 63 16 100 % 145 lb 6.4 oz (66 kg)     Body mass index is 24.52 kg/(m^2).  Vitals were reviewed and were normal     Physical Exam   Constitutional: She is oriented to person, place, and time. She appears well-developed and well-nourished. No distress.   HENT:   Right Ear: Tympanic membrane normal.   Mouth/Throat: No oropharyngeal exudate.   Erythematous oropharynx without exudate.   Eyes: Conjunctivae are normal. Right eye exhibits no discharge. Left eye exhibits no discharge. No scleral icterus.   Neck: Normal range of motion. Neck supple. No thyromegaly present.   Cardiovascular: Normal rate, regular rhythm and normal heart sounds.  Exam reveals no gallop and no friction rub.    No murmur heard.  Pulmonary/Chest: Effort normal and breath sounds normal. No respiratory distress. She has no wheezes. She has no rales.   Abdominal: Soft. Bowel sounds are normal. She exhibits no distension and no mass. There is no rebound.   Epigastric tenderness   Musculoskeletal:   TMJ tenderness.    Lymphadenopathy:     She has no cervical adenopathy.   Neurological: She is alert and oriented to person, place, and time.   Skin: Skin is warm and dry. No rash noted.   Psychiatric: She has a normal mood and affect.         Results:     None    Assessment and Plan     Becky was seen today for headache, ent problem and abdominal pain.    Diagnoses and all orders for this visit:    History of asthma  -     No recent spirometry but uses an albuterol inhaler as needed when she is ill.  -     Spirometry Pre/Post (Bronchodilation Response); Future - needs spirometry testing.     Temporomandibular joint  disorder  Discussed likely contributing to ear pain and headaches  -     naproxen (NAPROSYN) 375 MG tablet; Take 1 tablet (375 mg) by mouth 2 times daily (with meals)  - Encouraged to apply heat for 20 minutes, 2-3 times daily.    Abdominal pain, epigastric  -     Epigastric tenderness with abdominal symptoms suspicious for H pylori  - H Pylori antigen stool; Future  -     ranitidine (ZANTAC) 150 MG tablet; Take 1 tablet (150 mg) by mouth 2 times daily - start medication after obtaining stool sample.     Constipation, unspecified constipation type  -     docusate sodium (COLACE) 100 MG tablet; Take 100 mg by mouth 2 times daily  -     Increase fluids and fruits/vegetable intake.        Plan follow-up in clinic after spirometry testing completed, sooner as needed.       Options for treatment and follow-up care were reviewed with the patient. Becky Mock  engaged in the decision making process and verbalized understanding of the options discussed and agreed with the final plan.    Nazanin Segura RN, DNP-FNP student [facility]    History and physical examination done with student nurse practitioner.  Student acted as scribe for this encounter.  I agree with assessment and plan for this patient.     Codie Becerril, CINDY CNP

## 2017-10-25 DIAGNOSIS — R10.13 ABDOMINAL PAIN, EPIGASTRIC: ICD-10-CM

## 2017-10-30 LAB
H PYLORI AG STL QL IA: NORMAL
SPECIMEN SOURCE: NORMAL

## 2017-11-08 NOTE — PROGRESS NOTES
Hello,   Follow up needed regarding test results:   Please assist if able with these abnormal test results. Patient had previously seen me and was supposed to return to clinic to discuss her test results but never made a follow-up appointment. Because I will no longer be practicing within the [facility] system, I am reaching out to you to help follow her up as you deem fit. As you are aware, Becky is a female of childbearing years who has very low folate level.   On review of her records I see that she consulted with you regarding difficulty conceiving and in the light of this I feel that her vitamin deficiencies most especially folate will need to be replaced as she will be at risk for having a child with neural tube malformation with her folate levels being that low. Her B1, B6, vitamin D and iron levels are also very low. She may need evaluation for celiac sprue so consideration for obtaining celiac sprue serology probably needs to be entertained.  Thank you.

## 2019-02-05 ENCOUNTER — TRANSFERRED RECORDS (OUTPATIENT)
Dept: HEALTH INFORMATION MANAGEMENT | Facility: CLINIC | Age: 34
End: 2019-02-05

## 2019-02-26 ENCOUNTER — TRANSFERRED RECORDS (OUTPATIENT)
Dept: HEALTH INFORMATION MANAGEMENT | Facility: CLINIC | Age: 34
End: 2019-02-26

## 2019-03-28 ENCOUNTER — TELEPHONE (OUTPATIENT)
Dept: PULMONOLOGY | Facility: CLINIC | Age: 34
End: 2019-03-28

## 2019-03-29 ENCOUNTER — TELEPHONE (OUTPATIENT)
Dept: ALLERGY | Facility: CLINIC | Age: 34
End: 2019-03-29

## 2019-03-29 NOTE — TELEPHONE ENCOUNTER
External referral received from [facility] at [facility] for Allergy consultation. Called to patient with , no answer. We left detailed message for Aparna Julesdley main line left in message to call back and schedule. Referral sent to be scanned into Pt chart.      Haydee Angulo CMA

## 2019-04-12 ENCOUNTER — RECORDS - HEALTHEAST (OUTPATIENT)
Dept: ADMINISTRATIVE | Facility: OTHER | Age: 34
End: 2019-04-12

## 2019-07-08 ENCOUNTER — OFFICE VISIT (OUTPATIENT)
Dept: ALLERGY | Facility: CLINIC | Age: 34
End: 2019-07-08
Payer: COMMERCIAL

## 2019-07-08 VITALS
OXYGEN SATURATION: 97 % | BODY MASS INDEX: 26.21 KG/M2 | WEIGHT: 155.4 LBS | DIASTOLIC BLOOD PRESSURE: 80 MMHG | HEART RATE: 92 BPM | SYSTOLIC BLOOD PRESSURE: 111 MMHG

## 2019-07-08 DIAGNOSIS — J31.0 CHRONIC RHINITIS: ICD-10-CM

## 2019-07-08 DIAGNOSIS — J45.40 ASTHMA, MODERATE PERSISTENT, POORLY-CONTROLLED: Primary | ICD-10-CM

## 2019-07-08 DIAGNOSIS — K59.00 CONSTIPATION, UNSPECIFIED CONSTIPATION TYPE: ICD-10-CM

## 2019-07-08 DIAGNOSIS — L50.8 CHRONIC URTICARIA: ICD-10-CM

## 2019-07-08 LAB
BASOPHILS # BLD AUTO: 0 10E9/L (ref 0–0.2)
BASOPHILS NFR BLD AUTO: 0.7 %
DIFFERENTIAL METHOD BLD: ABNORMAL
EOSINOPHIL # BLD AUTO: 0 10E9/L (ref 0–0.7)
EOSINOPHIL NFR BLD AUTO: 0.5 %
ERYTHROCYTE [DISTWIDTH] IN BLOOD BY AUTOMATED COUNT: 15.4 % (ref 10–15)
FEF 25/75: NORMAL
FEV-1: NORMAL
FEV1/FVC: NORMAL
FVC: NORMAL
HCT VFR BLD AUTO: 38.2 % (ref 35–47)
HGB BLD-MCNC: 12.5 G/DL (ref 11.7–15.7)
LYMPHOCYTES # BLD AUTO: 1.6 10E9/L (ref 0.8–5.3)
LYMPHOCYTES NFR BLD AUTO: 38.5 %
MCH RBC QN AUTO: 29.5 PG (ref 26.5–33)
MCHC RBC AUTO-ENTMCNC: 32.7 G/DL (ref 31.5–36.5)
MCV RBC AUTO: 90 FL (ref 78–100)
MONOCYTES # BLD AUTO: 0.5 10E9/L (ref 0–1.3)
MONOCYTES NFR BLD AUTO: 11.5 %
NEUTROPHILS # BLD AUTO: 2.1 10E9/L (ref 1.6–8.3)
NEUTROPHILS NFR BLD AUTO: 48.8 %
PLATELET # BLD AUTO: 352 10E9/L (ref 150–450)
RBC # BLD AUTO: 4.24 10E12/L (ref 3.8–5.2)
TSH SERPL DL<=0.005 MIU/L-ACNC: 0.84 MU/L (ref 0.4–4)
WBC # BLD AUTO: 4.3 10E9/L (ref 4–11)

## 2019-07-08 PROCEDURE — 99204 OFFICE O/P NEW MOD 45 MIN: CPT | Mod: 25 | Performed by: ALLERGY & IMMUNOLOGY

## 2019-07-08 PROCEDURE — 94060 EVALUATION OF WHEEZING: CPT | Performed by: ALLERGY & IMMUNOLOGY

## 2019-07-08 PROCEDURE — 85025 COMPLETE CBC W/AUTO DIFF WBC: CPT | Performed by: ALLERGY & IMMUNOLOGY

## 2019-07-08 PROCEDURE — 36415 COLL VENOUS BLD VENIPUNCTURE: CPT | Performed by: ALLERGY & IMMUNOLOGY

## 2019-07-08 PROCEDURE — 82306 VITAMIN D 25 HYDROXY: CPT | Performed by: ALLERGY & IMMUNOLOGY

## 2019-07-08 PROCEDURE — 82785 ASSAY OF IGE: CPT | Performed by: ALLERGY & IMMUNOLOGY

## 2019-07-08 PROCEDURE — 84443 ASSAY THYROID STIM HORMONE: CPT | Performed by: ALLERGY & IMMUNOLOGY

## 2019-07-08 RX ORDER — FLUTICASONE PROPIONATE 50 MCG
2 SPRAY, SUSPENSION (ML) NASAL DAILY
Qty: 16 G | Refills: 11 | Status: SHIPPED | OUTPATIENT
Start: 2019-07-08

## 2019-07-08 NOTE — Clinical Note
7/8/2019         RE: Becky Mock  [address]  [facility] [place] [facility] [address]        Dear Colleague,    Thank you for referring your patient, Becky Mock, to the [facility]. Please see a copy of my visit note below.    Dear No ref. provider found    Thank you for referring your patient Becky Mock to the Allergy/Immunology Clinic. Becky Mock was seen in the Allergy Clinic at [facility]. The following are my recommendations regarding her {Allergydiagnoses:[id number]}    Becky Mock is a 34 year old  female being seen today in consultation for allergies. The history is obtained with the assistance of an .    She states she has a lot of allergy symptoms. Began when she was very small, not sure how old she was when it started. Has many various symptoms, some are worse at various times. When she was young she had a lot of fevers, cough, vomiting, and wheezing. Also had rhinorrhea and brings up a lot of sputum. These symtpoms have continued with age. As she aged she no longer gets the fevers. When she was young she had some difficulty with her hearing but now she no longer has problems with hearing but she does have pain and itchign in her ears. Her nose is congestion and most of the time is blocked. Soemtimes she has nose bleeds. When she sleeps at night she has a cough that wakes her up from sleeping - both when she is falling asleep and soemtimes wakes a from a deep sleep. Her voice has changed and become deeper due to the coughing. Has difficulty breathing. Not sure if she has asthma - has been told both she does and doesn't have it. People with asthma can't exercise or run - she has no difficulty with exercise or running. When exercising/going up and down stairs she does cough and spits up sputum, occasional shortness of breath. Does cough or get a headache, red eyes around chemicals or perfumes.  Also triggered by cigarette smoke. Does have difficulty breathign in the  "cold. Also with humidity. Was prescribed an inhaler and it helps but she gets \"sick\" when she uses it regularly. Feels it doesn't help so she stopped using it.    5 years ago she began having skin itching and rash. The skin becomes very red. She takes an antihistamine and if she stops the medication she becomes red, itchy, and swollen. She also has vomiting and bloating if she stops taking the medication. Reports severe constipation and has to take medication to go to the bathroom. Can go up to a month without taking a bowel movement if she doesn't take medication. She makes a tea and this helps, other prescribed medications have not been helpful. If she drinks the tea regularly her stomach gets used to it and it doesn't work as well. Taking 2 cetirizine at night, not using any nasal spray.     Also complains of lower back pain.    [employer] juice - heartburn, niki fruit is OK, now eating smoothie and mixes in greens - sometimes she is OK and other times she is \"sick.\" she will feel heavy and bloated.    Tomatoes - pain in the gums and tongue but no other IgE mediated symtpoms        Past Medical History:   Diagnosis Date     Normal puberty menarche age    cycles q  x  d     Uncomplicated asthma      Family History   Problem Relation Age of Onset     Asthma Sister      Asthma Son      Diabetes No family hx of      Coronary Artery Disease No family hx of      Past Surgical History:   Procedure Laterality Date     NO HISTORY OF SURGERY         ENVIRONMENTAL HISTORY: [place] The home is heated with a forced air. They does not have central air conditioning. The patient's bedroom is furnished with carpeting in bedroom and fabric window coverings.  Pets inside the house include None. There is history of cockroach or mice infestation. There is/are 0 smokers in the house.  The house does not have a basement.     SOCIAL HISTORY:   Becky is not currently working. She lives with her " spouse.    REVIEW OF SYSTEMS:  General: negative for weight gain. negative for weight loss. negative for changes in sleep.   Eyes: negative for itching. positive  for redness. negative for tearing/watering. negative for vision changes  Ears: negative for fullness. negative for hearing loss. negative for dizziness. Positive for pressure.  Nose: negative for snoring.negative for changes in smell. positive  for drainage. Positive for congestion.  Throat: negative for hoarseness. negative for sore throat. negative for trouble swallowing.   Lungs: positive  for cough. negative for shortness of breath.negative for wheezing. positive  for sputum production.   Cardiovascular: negative for chest pain. negative for swelling of ankles. negative for fast or irregular heartbeat.   Gastrointestinal: negative for nausea. negative for heartburn. negative for acid reflux.   Musculoskeletal: negative for joint pain. negative for joint stiffness. negative for joint swelling.   Neurologic: negative for seizures. negative for fainting. negative for weakness.   Psychiatric: negative for changes in mood. negative for anxiety.   Endocrine: negative for cold intolerance. negative for heat intolerance. negative for tremors.   Hematologic: negative for easy bruising. negative for easy bleeding.  Integumentary: positive for rash. negative for scaling. negative for nail changes. Positive for itching.      Current Outpatient Medications:      cetirizine (ZYRTEC) 10 MG tablet, Take 10 mg by mouth daily, Disp: , Rfl:      albuterol (PROAIR HFA/PROVENTIL HFA/VENTOLIN HFA) 108 (90 BASE) MCG/ACT Inhaler, Inhale 2 puffs into the lungs, Disp: , Rfl:      AMOXAPINE PO, Take 500 mg by mouth 2 times daily, Disp: , Rfl:      diphenhydrAMINE (BENADRYL) 25 MG capsule, Take 25 mg by mouth, Disp: , Rfl:      docusate sodium (COLACE) 100 MG tablet, Take 100 mg by mouth 2 times daily (Patient not taking: Reported on 7/8/2019), Disp: 60 tablet, Rfl: 3      fluticasone (FLONASE) 50 MCG/ACT spray, Spray 1 spray into both nostrils daily, Disp: , Rfl:      naproxen (NAPROSYN) 375 MG tablet, Take 1 tablet (375 mg) by mouth 2 times daily (with meals) (Patient not taking: Reported on 7/8/2019), Disp: 60 tablet, Rfl: 2     neomycin-polymyxin-hydrocortisone (CORTISPORIN) otic solution, Place 3 drops in ear(s) 4 times daily, Disp: , Rfl:      ranitidine (ZANTAC) 150 MG tablet, Take 1 tablet (150 mg) by mouth 2 times daily (Patient not taking: Reported on 7/8/2019), Disp: 60 tablet, Rfl: 3  Immunization History   Administered Date(s) Administered     TD (ADULT, 7+) 01/01/2015     No Known Allergies      EXAM:   /80 (BP Location: Left arm, Patient Position: Sitting, Cuff Size: Adult Regular)   Pulse 92   Wt 70.5 kg (155 lb 6.4 oz)   SpO2 97%   BMI 26.21 kg/m     GENERAL APPEARANCE: alert, cooperative and not in distress  SKIN: no rashes, no lesions  HEAD: atraumatic, normocephalic  EYES: lids and lashes normal, conjunctivae and sclerae clear, pupils equal, round, reactive to light, EOM full and intact  ENT: no scars or lesions, nasal exam showed no discharge, swelling or lesions noted, otoscopy showed external auditory canals clear, tympanic membranes normal, tongue midline and normal, soft palate, uvula, and tonsils normal  NECK: no asymmetry, masses, or scars, supple without significant adenopathy  LUNGS: unlabored respirations, no intercostal retractions or accessory muscle use, clear to auscultation without rales or wheezes  HEART: regular rate and rhythm without murmurs and normal S1 and S2  MUSCULOSKELETAL: no musculoskeletal defects are noted  NEURO: no focal deficits noted  PSYCH: does not appear depressed or anxious    WORKUP: Spirometry  SPIROMETRY  initial FVC 2.52L (78% of predicted); after bronchodilator 2.54L (1% change)   initial FEV1 2.05L (75% of predicted); after bronchodilator 2.08L (2% change)  initial FEV1/FVC 81%; after bronchodilator 82%    I have  reviewed and interpreted these results. These values are consistent with mild airflow obstruction. There was no significant change after bronchodilator administration.    ASSESSMENT/PLAN:  Becky Mock is a 34 year old female    ***      Thank you for allowing me to participate in the care of Becky Mock.      Kirk Beltran MD  Allergy/Immunology  [place], [place]      Chart documentation done in part with Dragon Voice Recognition Software. Although reviewed after completion, some word and grammatical errors may remain.    Again, thank you for allowing me to participate in the care of your patient.        Sincerely,        Kirk Beltran MD

## 2019-07-08 NOTE — PROGRESS NOTES
Becky Mock was seen in the Allergy Clinic at [facility]. The following are my recommendations regarding her Moderate Persistent Asthma, Chronic Urticaria, Chronic Rhinitis, and Constipation    1. Begin Dulera 100/5mcg, 2 puffs twice daily  2. Take 2 to 4 puffs of albuterol HFA every 4 hours as needed  3. Optichamber given in clinic and appropriate inhaler and spacer technique reviewed  4. Continue cetirizine 20mg daily  5. Increase fluticasone nasal spray dose to to 2 sprays in each nostril daily  6. Will check CBC, CMP, TSH, total IgE, and vitamin D  7. Orders placed for consultation with gastroenterology regarding constipation at patient's request  8. Follow-up in 1 month      Becky Mock is a 34 year old  female being seen today in consultation for allergies. The history is obtained with the assistance of an . She states that she has a lot of allergy symptoms. She has had these symptoms since she was quite young. As a child she reports having a lot of fevers, cough, wheezing, and vomiting. Becky has also had rhinorrhea and drainage and brings up a lot of sputum. As she has gotten older her symptoms have persisted though she no longer gets fevers. Additional symptoms include nasal congestion, itchy ears, and occasional nose bleeds. These symptoms are present throughout the year and she has difficulty breathing through her nose. At night she has difficulty sleeping and wakes up due to coughing. The cough occurs as she is trying to fall asleep but she has also woken up from deep sleep due to coughing. Becky feels her voice has changed and become deeper due to the cough. She does have difficulty breathing at times but states that she does not have any limitations with exercise or running. She does have to cough and spit up sputum when exercising or going up and down the stairs but this does not make her short of breath. Her shortness of breath is exacerbated by cold weather and humidity She  is not sure if she has asthma as some providers have given her the diagnosis and others have told her she doesn't have it. She has been prescribed an albuterol inhaler that she uses when she gets sick. Becky states that irritants such as chemicals, perfumes, and cigarette smoke exacerbate her symptoms and cause headache, redness and irritation of the eyes, and cough.    5 years ago Becky began having symptoms of itching and skin rash. Her skin becomes very red, itchy, and swollen. She has been taking an antihistamine and when stopping the medication her symptoms return along with vomiting and bloating. She takes 20mg of cetirizine daily to control her symptoms. Becky also reports symptoms of severe constipation and reactions to foods. She states she can go up to a month without having a bowel movement if she doesn't take medications or drink a special tea to help relieve her symptoms. When she drinks niki juice she has symptoms of heartburn but she is able to tolerate regular niki. Tomatoes have also caused pain in her gums and on her tongue but no other symptoms.      Past Medical History:   Diagnosis Date     Normal puberty menarche age    cycles q  x  d     Uncomplicated asthma      Family History   Problem Relation Age of Onset     Asthma Sister      Asthma Son      Diabetes No family hx of      Coronary Artery Disease No family hx of      Past Surgical History:   Procedure Laterality Date     NO HISTORY OF SURGERY         ENVIRONMENTAL HISTORY: [place] The home is heated with a forced air. They does not have central air conditioning. The patient's bedroom is furnished with carpeting in bedroom and fabric window coverings.  Pets inside the house include None. There is history of cockroach or mice infestation. There is/are 0 smokers in the house.  The house does not have a basement.     SOCIAL HISTORY:   Becky is not currently working. She lives with her spouse.    REVIEW  OF SYSTEMS:  General: negative for weight gain. negative for weight loss. negative for changes in sleep.   Eyes: negative for itching. positive  for redness. negative for tearing/watering. negative for vision changes  Ears: negative for fullness. negative for hearing loss. negative for dizziness. Positive for pressure.  Nose: negative for snoring.negative for changes in smell. positive  for drainage. Positive for congestion.  Throat: negative for hoarseness. negative for sore throat. negative for trouble swallowing.   Lungs: positive  for cough. negative for shortness of breath.negative for wheezing. positive  for sputum production.   Cardiovascular: negative for chest pain. negative for swelling of ankles. negative for fast or irregular heartbeat.   Gastrointestinal: negative for nausea. negative for heartburn. negative for acid reflux.   Musculoskeletal: negative for joint pain. negative for joint stiffness. negative for joint swelling.   Neurologic: negative for seizures. negative for fainting. negative for weakness.   Psychiatric: negative for changes in mood. negative for anxiety.   Endocrine: negative for cold intolerance. negative for heat intolerance. negative for tremors.   Hematologic: negative for easy bruising. negative for easy bleeding.  Integumentary: positive for rash. negative for scaling. negative for nail changes. Positive for itching.      Current Outpatient Medications:      cetirizine (ZYRTEC) 10 MG tablet, Take 10 mg by mouth daily, Disp: , Rfl:      albuterol (PROAIR HFA/PROVENTIL HFA/VENTOLIN HFA) 108 (90 BASE) MCG/ACT Inhaler, Inhale 2 puffs into the lungs, Disp: , Rfl:      AMOXAPINE PO, Take 500 mg by mouth 2 times daily, Disp: , Rfl:      diphenhydrAMINE (BENADRYL) 25 MG capsule, Take 25 mg by mouth, Disp: , Rfl:      docusate sodium (COLACE) 100 MG tablet, Take 100 mg by mouth 2 times daily (Patient not taking: Reported on 7/8/2019), Disp: 60 tablet, Rfl: 3     fluticasone (FLONASE) 50  MCG/ACT spray, Spray 1 spray into both nostrils daily, Disp: , Rfl:      naproxen (NAPROSYN) 375 MG tablet, Take 1 tablet (375 mg) by mouth 2 times daily (with meals) (Patient not taking: Reported on 7/8/2019), Disp: 60 tablet, Rfl: 2     neomycin-polymyxin-hydrocortisone (CORTISPORIN) otic solution, Place 3 drops in ear(s) 4 times daily, Disp: , Rfl:      ranitidine (ZANTAC) 150 MG tablet, Take 1 tablet (150 mg) by mouth 2 times daily (Patient not taking: Reported on 7/8/2019), Disp: 60 tablet, Rfl: 3  Immunization History   Administered Date(s) Administered     TD (ADULT, 7+) 01/01/2015     No Known Allergies      EXAM:   /80 (BP Location: Left arm, Patient Position: Sitting, Cuff Size: Adult Regular)   Pulse 92   Wt 70.5 kg (155 lb 6.4 oz)   SpO2 97%   BMI 26.21 kg/m    GENERAL APPEARANCE: alert, cooperative and not in distress  SKIN: no rashes, no lesions  HEAD: atraumatic, normocephalic  EYES: lids and lashes normal, conjunctivae and sclerae clear, pupils equal, round, reactive to light, EOM full and intact  ENT: no scars or lesions, nasal exam showed no discharge, swelling or lesions noted, otoscopy showed external auditory canals clear, tympanic membranes normal, tongue midline and normal, soft palate, uvula, and tonsils normal  NECK: no asymmetry, masses, or scars, supple without significant adenopathy  LUNGS: unlabored respirations, no intercostal retractions or accessory muscle use, clear to auscultation without rales or wheezes  HEART: regular rate and rhythm without murmurs and normal S1 and S2  MUSCULOSKELETAL: no musculoskeletal defects are noted  NEURO: no focal deficits noted  PSYCH: does not appear depressed or anxious    WORKUP: Spirometry  SPIROMETRY  initial FVC 2.52L (78% of predicted); after bronchodilator 2.54L (1% change)   initial FEV1 2.05L (75% of predicted); after bronchodilator 2.08L (2% change)  initial FEV1/FVC 81%; after bronchodilator 82%    I have reviewed and interpreted  these results. These values are consistent with mild airflow obstruction. There was no significant change after bronchodilator administration.    ASSESSMENT/PLAN:  Becky Mock is a 34 year old female here for evaluation of allergies. She reports having chronic rhinitis symptoms since childhood. She suspects that these symptoms are due to allergies though she has never been tested. Skin testing was deferred today due to recent antihistamine use. Becky has symptoms of recurrent rash and itching that has been responsive to antihistamines. She reports worsening of these symptoms when antihistamines are stopped which is suggestive of chronic urticaria.     Becky also reports symptoms of cough and shortness of breath. She has at times been given a diagnosis of asthma but has never been on maintenance therapy. Spirometry obtained today reveals mild airflow obstruction.    1. Begin Dulera 100/5mcg, 2 puffs twice daily  2. Take 2 to 4 puffs of albuterol HFA every 4 hours as needed  3. Optichamber given in clinic and appropriate inhaler and spacer technique reviewed  4. Continue cetirizine 20mg daily  5. Increase fluticasone nasal spray dose to to 2 sprays in each nostril daily  6. Will check CBC, CMP, TSH, total IgE, and vitamin D  7. Orders placed for consultation with gastroenterology regarding constipation at patient's request  8. Follow-up in 1 month      Thank you for allowing me to participate in the care of Becky Mock.      Kirk Beltran MD  Allergy/Immunology  [facility] and [place], [place]      Chart documentation done in part with Dragon Voice Recognition Software. Although reviewed after completion, some word and grammatical errors may remain.

## 2019-07-08 NOTE — NURSING NOTE
The following nebulizer treatment was given:     MEDICATION: Albuterol Sulfate 2.5 mg  : [employer]  LOT #: [id number]  EXPIRATION DATE:  11/2020  NDC # 93646-133-15     Fabiola Hill RN

## 2019-07-08 NOTE — PATIENT INSTRUCTIONS
If you have any questions regarding your allergies, asthma, or what we discussed during your visit today please call the allergy clinic or contact us via [employer].    [facility] Allergy RN Line: [phone]  [facility] Scheduling Line: [phone]  [facility] Scheduling Line: [phone]      Follow-up in 1 month      Take the following medications:    1. Dulera Inhaler - 2 puffs twice a day. Use with the spacer. Rinse your mouth and brush your teeth afterwards.    2. Albuterol inhaler - Take 2 to 4 puffs every 4 hours as needed for cough, shortness of breath, chest tightness, or wheezing    3. Zyrtec (cetirizine) - Take 2 tablets at bedtime. You are already taking this allergy medication.    4. Flonase (fluticasone) nasal spray - 2 sprays in each nostril each daily      Patient Education     Using an Inhaler with a Spacer  To control asthma, you need to use your medicines the right way. Some medicines are inhaled using a device called a metered-dose inhaler (MDI). Metered-dose inhalers deliver medicine with a fine spray. You may be asked to use a spacer (holding tube) with your inhaler. The spacer helps make sure all the medicine you need goes into your lungs.   Steps for using an inhaler with a spacer  Step 1:    Remove the caps from the inhaler and spacer.    Shake the inhaler well and attach the spacer. If the inhaler is being used for the first time or has not been used for a while, prime it as directed by the product maker.  Step 2:    Breathe out normally.    Put the spacer between your teeth. Close your lips tightly around it.    Keep your chin up.  Step 3:    Spray 1 puff into the spacer by pressing down on the inhaler.    Then breathe in through your mouth as slowly and deeply as you can. This should take about 5-10 seconds. If you breathe too quickly, you may hear a whistling sound in certain spacers.  Step 4:    Take the spacer out of your mouth.    Hold your breath for  a count of 10.    Then hold your lips together and slowly breathe out through your mouth.          If you re prescribed more than 1 puff of medicine at a time, wait at least 30 seconds between puffs. This number may be different for different medicines. Shake the inhaler again. Then repeat steps 2 to 4.   Date Last Reviewed: 10/1/2016    4065-6132 [employer]. [address] [place] [address], [place] [address]. All rights reserved. This information is not intended as a substitute for professional medical care. Always follow your healthcare professional's instructions.

## 2019-07-09 LAB
DEPRECATED CALCIDIOL+CALCIFEROL SERPL-MC: 27 UG/L (ref 20–75)
IGE SERPL-ACNC: 83 KIU/L (ref 0–114)

## 2019-07-15 ENCOUNTER — TRANSFERRED RECORDS (OUTPATIENT)
Dept: PHYSICAL THERAPY | Facility: CLINIC | Age: 34
End: 2019-07-15

## 2019-07-31 ENCOUNTER — TELEPHONE (OUTPATIENT)
Dept: ALLERGY | Facility: CLINIC | Age: 34
End: 2019-07-31

## 2019-07-31 NOTE — TELEPHONE ENCOUNTER
Please call patient via [employer]  to discuss lab results. Her CBC, thyroid studies, and vitamin D level were all normal and do not indicate any reason for her rash and itching. She should continue with medications as discussed at her clinic visit and follow-up as scheduled at the end of August.

## 2019-07-31 NOTE — LETTER
8/8/2019     Becky Mock  [address]   [place] [address]      Dear Becky:    Thank you for allowing us to participate in your care. Your recent test results were reviewed and listed below.      Dr. Beltran asked us to reach out to you with your recent lab results.  We have been unable to reach you by phone.    Your labs (complete blood count, thyroid studies, vitamin D level) were all normal.  They do not indicate any reason for your rash and itching.  Please continue with the medications discussed at your appointment with Dr. Beltran, and we will see you at your follow up appointment scheduled for 8/26/19.    Sincerely,    Your [facility]  [facility]  [address]  [place] [address]  Phone: [phone]

## 2019-08-01 NOTE — TELEPHONE ENCOUNTER
Left message via [employer]  for patient to return call to RN's direct line [phone].    Fabiola Hill RN

## 2019-08-07 NOTE — TELEPHONE ENCOUNTER
Left second message via [employer]  line for patient to return call to allergy department at [facility] ([phone]).    Nadege Dumont RN

## 2019-08-08 NOTE — TELEPHONE ENCOUNTER
Have been unable to reach patient by phone.  Two voicemails have been left with RN's return line, but patient has not returned call.  Results printed into letter format and mailed to home address on file.  Closing encounter.    Nadege Dumont RN

## 2019-08-13 ENCOUNTER — TRANSFERRED RECORDS (OUTPATIENT)
Dept: HEALTH INFORMATION MANAGEMENT | Facility: CLINIC | Age: 34
End: 2019-08-13

## 2019-08-26 ENCOUNTER — OFFICE VISIT (OUTPATIENT)
Dept: ALLERGY | Facility: CLINIC | Age: 34
End: 2019-08-26
Payer: COMMERCIAL

## 2019-08-26 VITALS
HEART RATE: 80 BPM | OXYGEN SATURATION: 99 % | DIASTOLIC BLOOD PRESSURE: 78 MMHG | SYSTOLIC BLOOD PRESSURE: 104 MMHG | WEIGHT: 155.42 LBS | BODY MASS INDEX: 26.21 KG/M2

## 2019-08-26 DIAGNOSIS — J45.40 ASTHMA, MODERATE PERSISTENT, POORLY-CONTROLLED: Primary | ICD-10-CM

## 2019-08-26 DIAGNOSIS — J31.0 CHRONIC RHINITIS: ICD-10-CM

## 2019-08-26 LAB
FEF 25/75: NORMAL
FEV-1: NORMAL
FEV1/FVC: NORMAL
FVC: NORMAL

## 2019-08-26 PROCEDURE — 94010 BREATHING CAPACITY TEST: CPT | Performed by: ALLERGY & IMMUNOLOGY

## 2019-08-26 PROCEDURE — 99214 OFFICE O/P EST MOD 30 MIN: CPT | Mod: 25 | Performed by: ALLERGY & IMMUNOLOGY

## 2019-08-26 RX ORDER — ALBUTEROL SULFATE 90 UG/1
2 AEROSOL, METERED RESPIRATORY (INHALATION) EVERY 4 HOURS PRN
Qty: 1 INHALER | Refills: 3 | Status: SHIPPED | OUTPATIENT
Start: 2019-08-26

## 2019-08-26 RX ORDER — AZELASTINE 1 MG/ML
2 SPRAY, METERED NASAL 2 TIMES DAILY
Qty: 1 BOTTLE | Refills: 11 | Status: SHIPPED | OUTPATIENT
Start: 2019-08-26

## 2019-08-26 NOTE — PATIENT INSTRUCTIONS
If you have any questions regarding your allergies, asthma, or what we discussed during your visit today please call the allergy clinic or contact us via [employer].    [facility] RN Line: [phone]  [facility] Scheduling Line: [phone]  [facility] Scheduling Line: [phone]      Take 2 puffs of the Dulera Inhaler (blue) every morning and 2 puffs every evening. Use with the spacer. Rinse your mouth and brush your teeth afterwards.      Take 2 puffs of your albuterol rescue inhaler (Ventolin or ProAir) 15 minutes before you start work. You should also take another 2 puffs 4 hours later if you are working for 8 hours (take the medication every 4 hours)      Use the fluticasone nasal spray every day - 2 sprays in each nostril once a day      Use the azelastine nasal spray - 2 sprays in each nostril once or twice a day      Follow-up in 6 weeks

## 2019-08-26 NOTE — LETTER
August 26, 2019      Becky Mock  [address]   [place] [facility] [address]        To Whom It May Concern:    Becky Mock was seen in our clinic on 8/26/19. She has asthma that is triggered and exacerbated by chemical exposure and activity. She is currently being actively treated and followed for her asthma. Please allow for the following accommodations so that she may be able to perform her work duties:    1. Mask to be worn while cleaning and vacuuming to help reduce exposure to dust and chemicals  2. Allow additional time for completion of tasks  3. Allow for her to carry asthma medication with while working so that she may have access to necessary medication when needed      Please contact me at the number above if there are any questions or concerns.      Sincerely,          Kirk Beltran MD

## 2019-08-26 NOTE — PROGRESS NOTES
Becky Mock was seen in the Allergy Clinic at [facility]. The following are my recommendations regarding her Moderate Persistent Asthma and Chronic Rhinitis    1. Continue Dulera 100/5mcg, 2 puffs twice daily - appropriate inhaler and spacer technique reviewed  2. Take 2 to 4 puffs of albuterol HFA every 4 hours as needed. Also take 2 puffs 15 minutes prior to work or other activity.  3. Continue fluticasone nasal spray, 2 sprays in each nostril daily  4. Begin azelastine nasal spray, 2 sprays in each nostril twice daily  5. Follow-up in 4 to 6 weeks      Becky Mock is a 34 year old [place]ander female who is seen today for follow-up of asthma and chronic rhinitis. The history was obtained with the assistance of an . She reports that she has improved over the past month but does continue to have symptoms of cough and nasal congestion. She feels that the fluticasone nasal spray has been helpful and she is using this regularly every evening.    Becky reports she has been using her asthma inhaler though upon further discussion she is not taking it as prescribed. She states she has had some difficulty with using the inhaler although when she feels she gets the medicine she finds it is helpful. Becky has concerns about environmental exposures both at home and at work. She lives in an older home that is very luis and she feels this triggers her symptoms. She also notes symptoms with activity and exposure to some chemicals. 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 Becky states they are required to clean a certain number of rooms in a short period of time and the activity triggers her asthma. She also has been triggered by chemical exposures.      Past Medical History:   Diagnosis Date     Normal puberty menarche age    cycles q  x  d     Uncomplicated asthma      Family History   Problem  Relation Age of Onset     Asthma Sister      Asthma Son      Diabetes No family hx of      Coronary Artery Disease No family hx of      Social History     Tobacco Use     Smoking status: Never Smoker     Smokeless tobacco: Never Used   Substance Use Topics     Alcohol use: No     Drug use: No       Past medical, family, and social history were reviewed.    REVIEW OF SYSTEMS:  General: negative for weight gain. negative for weight loss. negative for changes in sleep.   Eyes: negative for itching. negative for redness. negative for tearing/watering. negative for vision changes  Ears: negative for fullness. negative for hearing loss. negative for dizziness.   Nose: negative for snoring.positive  for changes in smell. positive  for drainage. Positive for congestion.   Throat: negative for hoarseness. negative for sore throat. negative for trouble swallowing.   Lungs: positive  for cough. negative for shortness of breath.negative for wheezing. negative for sputum production.   Cardiovascular: negative for chest pain. negative for swelling of ankles. negative for fast or irregular heartbeat.   Gastrointestinal: negative for nausea. negative for heartburn. negative for acid reflux.   Musculoskeletal: negative for joint pain. negative for joint stiffness. negative for joint swelling.   Neurologic: negative for seizures. negative for fainting. negative for weakness.   Psychiatric: negative for changes in mood. negative for anxiety.   Endocrine: negative for cold intolerance. negative for heat intolerance. negative for tremors.   Hematologic: negative for easy bruising. negative for easy bleeding.  Integumentary: negative for rash. negative for scaling. negative for nail changes.       Current Outpatient Medications:      albuterol (PROAIR HFA/PROVENTIL HFA/VENTOLIN HFA) 108 (90 Base) MCG/ACT inhaler, Inhale 2 puffs into the lungs every 4 hours as needed (cough, wheezing, shortness of breath) Also take 2 puffs 15 minutes  prior to work, Disp: 1 Inhaler, Rfl: 3     AMOXAPINE PO, Take 500 mg by mouth 2 times daily, Disp: , Rfl:      azelastine (ASTELIN) 0.1 % nasal spray, Spray 2 sprays into both nostrils 2 times daily, Disp: 1 Bottle, Rfl: 11     cetirizine (ZYRTEC) 10 MG tablet, Take 10 mg by mouth daily, Disp: , Rfl:      fluticasone (FLONASE) 50 MCG/ACT nasal spray, Spray 2 sprays into both nostrils daily, Disp: 16 g, Rfl: 11     mometasone-formoterol (DULERA) 100-5 MCG/ACT inhaler, Inhale 2 puffs into the lungs 2 times daily, Disp: 1 Inhaler, Rfl: 1     diphenhydrAMINE (BENADRYL) 25 MG capsule, Take 25 mg by mouth, Disp: , Rfl:      docusate sodium (COLACE) 100 MG tablet, Take 100 mg by mouth 2 times daily (Patient not taking: Reported on 7/8/2019), Disp: 60 tablet, Rfl: 3     naproxen (NAPROSYN) 375 MG tablet, Take 1 tablet (375 mg) by mouth 2 times daily (with meals) (Patient not taking: Reported on 7/8/2019), Disp: 60 tablet, Rfl: 2     neomycin-polymyxin-hydrocortisone (CORTISPORIN) otic solution, Place 3 drops in ear(s) 4 times daily, Disp: , Rfl:      ranitidine (ZANTAC) 150 MG tablet, Take 1 tablet (150 mg) by mouth 2 times daily (Patient not taking: Reported on 7/8/2019), Disp: 60 tablet, Rfl: 3    EXAM:   /78 (BP Location: Left arm, Patient Position: Sitting, Cuff Size: Adult Regular)   Pulse 80   Wt 70.5 kg (155 lb 6.8 oz)   SpO2 99%   BMI 26.21 kg/m    GENERAL APPEARANCE: alert, cooperative and not in distress  SKIN: no rashes, no lesions  HEAD: atraumatic, normocephalic  EYES: lids and lashes normal, conjunctivae and sclerae clear  ENT: no scars or lesions, tongue midline and normal, soft palate, uvula, and tonsils normal  NECK: no asymmetry, masses, or scars, supple without significant adenopathy  LUNGS: unlabored respirations, no intercostal retractions or accessory muscle use, clear to auscultation without rales or wheezes  HEART: regular rate and rhythm without murmurs and normal S1 and  S2  MUSCULOSKELETAL: no musculoskeletal defects are noted  NEURO: no focal deficits noted  PSYCH: does not appear depressed or anxious      WORKUP:  Spirometry    SPIROMETRY       FVC 2.63L (81% of predicted).     FEV1 2.20L (80% of predicted).     FEV1/FVC 83%      I have reviewed and interpreted these results. These values are consistent with normal lung function.      ASSESSMENT/PLAN:  Becky Mock is a 34 year old female here for follow-up of asthma and chronic rhinitis. She reports her symptoms have improved though she does still have persistent cough and rhinitis symptoms. She reported having some difficulty with the asthma medication and her inhaler technique was assessed and noted to be poor. Becky was again instructed as to proper inhaler and spacer technique and medication dosages and timing were reviewed. She notes that her symptoms are exacerbated by increased activity and exposure to chemicals. A letter was written to allow for increased time to complete tasks as well as to minimize exposure to chemicals including improved ventilation and wearing a mask while cleaning.    1. Continue Dulera 100/5mcg, 2 puffs twice daily - appropriate inhaler and spacer technique reviewed  2. Take 2 to 4 puffs of albuterol HFA every 4 hours as needed. Also take 2 puffs 15 minutes prior to work or other activity.  3. Continue fluticasone nasal spray, 2 sprays in each nostril daily  4. Begin azelastine nasal spray, 2 sprays in each nostril twice daily  5. Follow-up in 4 to 6 weeks      Thank you for allowing me to participate in the care of Becky Mock.      Kirk Beltran MD  Allergy/Immunology  [place], [place]      Chart documentation done in part with Dragon Voice Recognition Software. Although reviewed after completion, some word and grammatical errors may remain.

## 2019-08-26 NOTE — LETTER
8/26/2019         RE: Becky Mock  [address]  [place] [facility] [address]        Dear Colleague,    Thank you for referring your patient, Becky Mock, to the [facility]. Please see a copy of my visit note below.    Becky Mock was seen in the Allergy Clinic at [facility]. The following are my recommendations regarding her Moderate Persistent Asthma and Chronic Rhinitis    1. Continue Dulera 100/5mcg, 2 puffs twice daily - appropriate inhaler and spacer technique reviewed  2. Take 2 to 4 puffs of albuterol HFA every 4 hours as needed. Also take 2 puffs 15 minutes prior to work or other activity.  3. Continue fluticasone nasal spray, 2 sprays in each nostril daily  4. Begin azelastine nasal spray, 2 sprays in each nostril twice daily  5. Follow-up in 4 to 6 weeks      Becky Mock is a 34 year old [place] female who is seen today for follow-up of asthma and chronic rhinitis. The history was obtained with the assistance of an . She reports that she has improved over the past month but does continue to have symptoms of cough and nasal congestion. She feels that the fluticasone nasal spray has been helpful and she is using this regularly every evening.    Becky reports she has been using her asthma inhaler though upon further discussion she is not taking it as prescribed. She states she has had some difficulty with using the inhaler although when she feels she gets the medicine she finds it is helpful. Becky has concerns about environmental exposures both at home and at work. She lives in an older home that is very luis and she feels this triggers her symptoms. She also notes symptoms with activity and exposure to some chemicals. 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 Becky states they are required to clean a certain number of rooms in a short period of  time and the activity triggers her asthma. She also has been triggered by chemical exposures.      Past Medical History:   Diagnosis Date     Normal puberty menarche age    cycles q  x  d     Uncomplicated asthma      Family History   Problem Relation Age of Onset     Asthma Sister      Asthma Son      Diabetes No family hx of      Coronary Artery Disease No family hx of      Social History     Tobacco Use     Smoking status: Never Smoker     Smokeless tobacco: Never Used   Substance Use Topics     Alcohol use: No     Drug use: No       Past medical, family, and social history were reviewed.    REVIEW OF SYSTEMS:  General: negative for weight gain. negative for weight loss. negative for changes in sleep.   Eyes: negative for itching. negative for redness. negative for tearing/watering. negative for vision changes  Ears: negative for fullness. negative for hearing loss. negative for dizziness.   Nose: negative for snoring.positive  for changes in smell. positive  for drainage. Positive for congestion.   Throat: negative for hoarseness. negative for sore throat. negative for trouble swallowing.   Lungs: positive  for cough. negative for shortness of breath.negative for wheezing. negative for sputum production.   Cardiovascular: negative for chest pain. negative for swelling of ankles. negative for fast or irregular heartbeat.   Gastrointestinal: negative for nausea. negative for heartburn. negative for acid reflux.   Musculoskeletal: negative for joint pain. negative for joint stiffness. negative for joint swelling.   Neurologic: negative for seizures. negative for fainting. negative for weakness.   Psychiatric: negative for changes in mood. negative for anxiety.   Endocrine: negative for cold intolerance. negative for heat intolerance. negative for tremors.   Hematologic: negative for easy bruising. negative for easy bleeding.  Integumentary: negative for rash. negative for scaling. negative for nail changes.        Current Outpatient Medications:      albuterol (PROAIR HFA/PROVENTIL HFA/VENTOLIN HFA) 108 (90 Base) MCG/ACT inhaler, Inhale 2 puffs into the lungs every 4 hours as needed (cough, wheezing, shortness of breath) Also take 2 puffs 15 minutes prior to work, Disp: 1 Inhaler, Rfl: 3     AMOXAPINE PO, Take 500 mg by mouth 2 times daily, Disp: , Rfl:      azelastine (ASTELIN) 0.1 % nasal spray, Spray 2 sprays into both nostrils 2 times daily, Disp: 1 Bottle, Rfl: 11     cetirizine (ZYRTEC) 10 MG tablet, Take 10 mg by mouth daily, Disp: , Rfl:      fluticasone (FLONASE) 50 MCG/ACT nasal spray, Spray 2 sprays into both nostrils daily, Disp: 16 g, Rfl: 11     mometasone-formoterol (DULERA) 100-5 MCG/ACT inhaler, Inhale 2 puffs into the lungs 2 times daily, Disp: 1 Inhaler, Rfl: 1     diphenhydrAMINE (BENADRYL) 25 MG capsule, Take 25 mg by mouth, Disp: , Rfl:      docusate sodium (COLACE) 100 MG tablet, Take 100 mg by mouth 2 times daily (Patient not taking: Reported on 7/8/2019), Disp: 60 tablet, Rfl: 3     naproxen (NAPROSYN) 375 MG tablet, Take 1 tablet (375 mg) by mouth 2 times daily (with meals) (Patient not taking: Reported on 7/8/2019), Disp: 60 tablet, Rfl: 2     neomycin-polymyxin-hydrocortisone (CORTISPORIN) otic solution, Place 3 drops in ear(s) 4 times daily, Disp: , Rfl:      ranitidine (ZANTAC) 150 MG tablet, Take 1 tablet (150 mg) by mouth 2 times daily (Patient not taking: Reported on 7/8/2019), Disp: 60 tablet, Rfl: 3    EXAM:   /78 (BP Location: Left arm, Patient Position: Sitting, Cuff Size: Adult Regular)   Pulse 80   Wt 70.5 kg (155 lb 6.8 oz)   SpO2 99%   BMI 26.21 kg/m     GENERAL APPEARANCE: alert, cooperative and not in distress  SKIN: no rashes, no lesions  HEAD: atraumatic, normocephalic  EYES: lids and lashes normal, conjunctivae and sclerae clear  ENT: no scars or lesions, tongue midline and normal, soft palate, uvula, and tonsils normal  NECK: no asymmetry, masses, or scars, supple  without significant adenopathy  LUNGS: unlabored respirations, no intercostal retractions or accessory muscle use, clear to auscultation without rales or wheezes  HEART: regular rate and rhythm without murmurs and normal S1 and S2  MUSCULOSKELETAL: no musculoskeletal defects are noted  NEURO: no focal deficits noted  PSYCH: does not appear depressed or anxious      WORKUP:  Spirometry    SPIROMETRY       FVC 2.63L (81% of predicted).     FEV1 2.20L (80% of predicted).     FEV1/FVC 83%      I have reviewed and interpreted these results. These values are consistent with normal lung function.      ASSESSMENT/PLAN:  Becky Mock is a 34 year old female here for follow-up of asthma and chronic rhinitis. She reports her symptoms have improved though she does still have persistent cough and rhinitis symptoms. She reported having some difficulty with the asthma medication and her inhaler technique was assessed and noted to be poor. Becky was again instructed as to proper inhaler and spacer technique and medication dosages and timing were reviewed. She notes that her symptoms are exacerbated by increased activity and exposure to chemicals. A letter was written to allow for increased time to complete tasks as well as to minimize exposure to chemicals including improved ventilation and wearing a mask while cleaning.    1. Continue Dulera 100/5mcg, 2 puffs twice daily - appropriate inhaler and spacer technique reviewed  2. Take 2 to 4 puffs of albuterol HFA every 4 hours as needed. Also take 2 puffs 15 minutes prior to work or other activity.  3. Continue fluticasone nasal spray, 2 sprays in each nostril daily  4. Begin azelastine nasal spray, 2 sprays in each nostril twice daily  5. Follow-up in 4 to 6 weeks      Thank you for allowing me to participate in the care of Becky Mock.      Kirk Beltran MD  Allergy/Immunology  [facility] and [place], [place]      Chart documentation done in part with Dragon Voice Recognition  Software. Although reviewed after completion, some word and grammatical errors may remain.    Again, thank you for allowing me to participate in the care of your patient.        Sincerely,        Kirk Beltran MD

## 2019-10-07 ENCOUNTER — OFFICE VISIT (OUTPATIENT)
Dept: ALLERGY | Facility: CLINIC | Age: 34
End: 2019-10-07
Payer: COMMERCIAL

## 2019-10-07 VITALS
SYSTOLIC BLOOD PRESSURE: 103 MMHG | DIASTOLIC BLOOD PRESSURE: 68 MMHG | BODY MASS INDEX: 26.07 KG/M2 | WEIGHT: 154.6 LBS | HEART RATE: 85 BPM | OXYGEN SATURATION: 97 %

## 2019-10-07 DIAGNOSIS — J31.0 CHRONIC RHINITIS: ICD-10-CM

## 2019-10-07 DIAGNOSIS — J45.40 ASTHMA, MODERATE PERSISTENT, POORLY-CONTROLLED: Primary | ICD-10-CM

## 2019-10-07 PROCEDURE — 99000 SPECIMEN HANDLING OFFICE-LAB: CPT | Performed by: ALLERGY & IMMUNOLOGY

## 2019-10-07 PROCEDURE — 36415 COLL VENOUS BLD VENIPUNCTURE: CPT | Performed by: ALLERGY & IMMUNOLOGY

## 2019-10-07 PROCEDURE — 86003 ALLG SPEC IGE CRUDE XTRC EA: CPT | Mod: 90 | Performed by: ALLERGY & IMMUNOLOGY

## 2019-10-07 PROCEDURE — 99213 OFFICE O/P EST LOW 20 MIN: CPT | Performed by: ALLERGY & IMMUNOLOGY

## 2019-10-07 PROCEDURE — 86003 ALLG SPEC IGE CRUDE XTRC EA: CPT | Mod: 59 | Performed by: ALLERGY & IMMUNOLOGY

## 2019-10-07 RX ORDER — CETIRIZINE HYDROCHLORIDE 10 MG/1
10 TABLET ORAL DAILY
Qty: 30 TABLET | Refills: 11 | Status: SHIPPED | OUTPATIENT
Start: 2019-10-07 | End: 2019-10-07

## 2019-10-07 RX ORDER — PREDNISONE 20 MG/1
TABLET ORAL
Qty: 15 TABLET | Refills: 0 | Status: SHIPPED | OUTPATIENT
Start: 2019-10-07 | End: 2020-06-25

## 2019-10-07 RX ORDER — CETIRIZINE HYDROCHLORIDE 10 MG/1
10 TABLET ORAL DAILY
Qty: 30 TABLET | Refills: 11 | Status: SHIPPED | OUTPATIENT
Start: 2019-10-07

## 2019-10-07 NOTE — PATIENT INSTRUCTIONS
If you have any questions regarding your allergies, asthma, or what we discussed during your visit today please call the allergy clinic or contact us via [employer].    [facility]: [phone]  [facility] Scheduling Line: [phone]  [facility] Scheduling Line: [phone]        1. Dulera - This inhaler (blue) should only be taken twice a day. 2 puffs in the morning and 2 puffs at night. You need to rinse your mouth and brush your teeth after taking this medication.    2. Ventolin (Albuterol) - This inhaler (blue/gray) can be taken every 4 hours as needed for cough, shortness of breath, or wheezing    3. Cetirizine - Take this medication once daily    4. Prednisone - Take 1 tablet twice a day x 5 days. Then take 1 tablet daily x 5 days. Then stop. Take this medication with food.    5. Schedule an appointment with the pulmonologist (lung doctor) and also schedule the lung function testing. These can be done on the same day.    6. Follow-up in 4 to 6 weeks    Patient Education   Page 1 of 2  Dulera HFA Inhaler  Medicines: Mometasone ([id number]) and Formoterol ([id number]-ahl)  What it does  These two medicines work to relax the muscles in your airway and help you breath more easily. They decrease inflammation, swelling and mucus over time.   Use every day to prevent symptoms, even if you are feeling well. Do not use for fast relief.   Test Spray (priming)  Prime the inhaler if using for the first time or if not used for 5 days. Shake the inhaler. Then spray into the air 4 times (away from the face).  How to use this inhaler  1. Wash and dry your hands well.  2. Remove the mouthpiece cover. Check for loose parts inside the mouthpiece.  3. Shake the inhaler several times for about 5 seconds.  4. Sit up straight or stand up.  5. Fully exhale (breathe out) through the mouth. Hold the inhaler so the mouthpiece is at the bottom and the canister is sticking straight  up.  6. Place the mouthpiece between your lips. Make sure that your tongue is flat under the mouthpiece and does not block the opening.    7. Seal your lips around the mouthpiece.  8. Push the canister all the way down as you begin to take in a deep breath through your mouth over 5 seconds.  9. Hold your breath for 10 seconds. If you cannot hold your breath for 10 seconds, then hold your breath for as long as you can.  10. If you need another dose, wait 30 seconds.  11. Repeat steps 3 to 10 until you reach the dose prescribed by your doctor.  12. Rinse your mouth after the last puff of medicine and spit the water out. Do not swallow.  13. Replace the cover after each use. Store in a cool, dry place.  This inhaler contains Formoterol, a LABA class medicine. Do not use with other LABA containing medicines.   Page 2 of 2  Cleaning  Clean the inside and outside of mouthpiece with a dry cloth or cotton swab 1 time each week. Be sure to clean the sprayer. Do not put any part of the inhaler in water.  Do not remove the cannister from the inhaler. You might not get the correct dose of medicine, and the dose counter may not work.  How to tell if the inhaler is empty  This inhaler contains 120 puffs (124 before priming). It is empty when the dose counter reads 0 (zero).  If you have questions about the use of your inhaler, please ask your pharmacist or provider.   For more information and video demos, go to [email or web address].  For informational purposes only. Not to replace the advice of your health care provider.   Copyright   2013 [facility]. All rights reserved. [employer] [id number] - REV 11/16.

## 2019-10-07 NOTE — LETTER
"    10/7/2019         RE: Becky Mock  [address]  [facility] [address]        Dear Colleague,    Thank you for referring your patient, Becky Mock, to the [facility]. Please see a copy of my visit note below.    Becky Mock was seen in the Allergy Clinic at [facility]. The following are my recommendations regarding her Moderate Persistent Asthma and Chronic Rhinitis    1. Increase Dulera to 200/5mcg, 2 puffs twice daily  2. Consider addition of montelukast or Spiriva for persistent symptoms  3. Start prednisone 20mg twice daily x 5 days then 20mg daily x 5 days  4. Resume cetirizine 10mg daily  5. Take 2 to 4 puffs of albuterol HFA every 4 hours as needed  6. Will check specific IgE to seasonal and perennial aeroallergens  7. Follow-up in 4 to 6 weeks      Becky Mock is a 34 year old [place]ian female who is seen today for follow-up of asthma and chronic rhinitis. The history is obtained with the assistance of an . She reports that she went to urgent care 3 weeks ago for symptoms of shortness of breath and chest pain. She was given a nebulizer treatment, advised to continue her prescribed medications, and prescribed a medication \"for infection.\" Becky does not recall what specific medication was prescribed. Recently she has been taking oral ampicillin that was [place] and given to her by a friend. Over the last 6 weeks she feels her symptoms have started to worsen. She has had occasional difficulty sleeping due to her symptoms. Her symptoms include nasal congestion, headaches, shortness of breath, chest pain, and back pain. Exposure to dust and chemical sprays worsen her symptoms. She has quit her job in the last few weeks as they were unable to provide accommodations to reduce her exposure to dust and cleaning chemicals.    Becky reports that she is taking her asthma medication every 2 to 3 hours. Upon further clarification she pointed to both her Dulera and " albuterol inhalers and is taking both medications every 2 to 3 hours. The medications provide temporary relief of her symptoms. She is requesting a referral to pulmonology today.      Past Medical History:   Diagnosis Date     Normal puberty menarche age    cycles q  x  d     Uncomplicated asthma      Family History   Problem Relation Age of Onset     Asthma Sister      Asthma Son      Diabetes No family hx of      Coronary Artery Disease No family hx of      Social History     Tobacco Use     Smoking status: Never Smoker     Smokeless tobacco: Never Used   Substance Use Topics     Alcohol use: No     Drug use: No       Past medical, family, and social history were reviewed.    REVIEW OF SYSTEMS:  General: negative for weight gain. negative for weight loss. negative for changes in sleep.   Eyes: negative for itching. negative for redness. negative for tearing/watering. negative for vision changes  Ears: negative for fullness. negative for hearing loss. negative for dizziness.   Nose: negative for snoring.negative for changes in smell. negative for drainage.   Throat: positive  for hoarseness. negative for sore throat. negative for trouble swallowing.   Lungs: positive  for cough. positive  for shortness of breath.positive  for wheezing. negative for sputum production.   Cardiovascular: positive  for chest pain. negative for swelling of ankles. negative for fast or irregular heartbeat.   Gastrointestinal: negative for nausea. negative for heartburn. negative for acid reflux.   Musculoskeletal: negative for joint pain. negative for joint stiffness. negative for joint swelling.   Neurologic: negative for seizures. negative for fainting. negative for weakness.   Psychiatric: negative for changes in mood. negative for anxiety.   Endocrine: positive  for cold intolerance. positive  for heat intolerance. negative for tremors.   Hematologic: negative for easy bruising. negative for easy bleeding.  Integumentary: positive   for rash. negative for scaling. negative for nail changes.       Current Outpatient Medications:      albuterol (PROAIR HFA/PROVENTIL HFA/VENTOLIN HFA) 108 (90 Base) MCG/ACT inhaler, Inhale 2 puffs into the lungs every 4 hours as needed (cough, wheezing, shortness of breath) Also take 2 puffs 15 minutes prior to work, Disp: 1 Inhaler, Rfl: 3     fluticasone (FLONASE) 50 MCG/ACT nasal spray, Spray 2 sprays into both nostrils daily, Disp: 16 g, Rfl: 11     omeprazole (PRILOSEC) 20 MG DR capsule, TAKE 1 CAPSULE BY MOUTH EVERY DAY 30 MINUTES TO 1 HOUR BEFORE A MEAL, Disp: , Rfl: 0     AMOXAPINE PO, Take 500 mg by mouth 2 times daily, Disp: , Rfl:      azelastine (ASTELIN) 0.1 % nasal spray, Spray 2 sprays into both nostrils 2 times daily (Patient not taking: Reported on 10/7/2019), Disp: 1 Bottle, Rfl: 11     cetirizine (ZYRTEC) 10 MG tablet, Take 10 mg by mouth daily, Disp: , Rfl:      diphenhydrAMINE (BENADRYL) 25 MG capsule, Take 25 mg by mouth, Disp: , Rfl:      docusate sodium (COLACE) 100 MG tablet, Take 100 mg by mouth 2 times daily (Patient not taking: Reported on 7/8/2019), Disp: 60 tablet, Rfl: 3     mometasone-formoterol (DULERA) 100-5 MCG/ACT inhaler, Inhale 2 puffs into the lungs 2 times daily (Patient not taking: Reported on 10/7/2019), Disp: 1 Inhaler, Rfl: 1     naproxen (NAPROSYN) 375 MG tablet, Take 1 tablet (375 mg) by mouth 2 times daily (with meals) (Patient not taking: Reported on 7/8/2019), Disp: 60 tablet, Rfl: 2     neomycin-polymyxin-hydrocortisone (CORTISPORIN) otic solution, Place 3 drops in ear(s) 4 times daily, Disp: , Rfl:      ranitidine (ZANTAC) 150 MG tablet, Take 1 tablet (150 mg) by mouth 2 times daily (Patient not taking: Reported on 7/8/2019), Disp: 60 tablet, Rfl: 3    EXAM:   /68 (BP Location: Left arm, Patient Position: Sitting, Cuff Size: Adult Regular)   Pulse 85   Wt 70.1 kg (154 lb 9.6 oz)   SpO2 97%   BMI 26.07 kg/m     GENERAL APPEARANCE: alert, cooperative and  not in distress  SKIN: no rashes, no lesions  HEAD: atraumatic, normocephalic  EYES: lids and lashes normal, conjunctivae and sclerae clear  ENT: no scars or lesions, nasal exam showed no discharge, swelling or lesions noted, tongue midline and normal, soft palate, uvula, and tonsils normal  NECK: no asymmetry, masses, or scars, supple without significant adenopathy  LUNGS: unlabored respirations, no intercostal retractions or accessory muscle use, clear to auscultation without rales or wheezes  HEART: regular rate and rhythm without murmurs and normal S1 and S2  MUSCULOSKELETAL: no musculoskeletal defects are noted  NEURO: no focal deficits noted  PSYCH: does not appear depressed or anxious      WORKUP:  None    ASSESSMENT/PLAN:  Becky Mock is a 34 year old female here for follow-up of asthma and chronic rhinitis. Over the past 6 weeks she feels her symptoms have been worsening despite frequently using her inhalers. Her initial evaluation, including labs and spirometry, has been unremarkable. Spirometry has been normal and does not show signs of airflow obstruction. We reviewed her currently prescribed medications and appropriate dosing and use. We discussed addition of LTRA and LAMA therapy in addition to ICS/LABA therapy however she did not wish to add new medications today.    1. Increase Dulera to 200/5mcg, 2 puffs twice daily  2. Consider addition of montelukast or Spiriva for persistent symptoms  3. Start prednisone 20mg twice daily x 5 days then 20mg daily x 5 days  4. Resume cetirizine 10mg daily  5. Take 2 to 4 puffs of albuterol HFA every 4 hours as needed  6. Will check specific IgE to seasonal and perennial aeroallergens  7. Follow-up in 4 to 6 weeks      Thank you for allowing me to participate in the care of Becky Mock.      Kirk Beltran MD  Allergy/Immunology  [facility] and [place], [place]      Chart documentation done in part with Dragon Voice Recognition Software. Although reviewed after  completion, some word and grammatical errors may remain.      Again, thank you for allowing me to participate in the care of your patient.        Sincerely,        Kirk Beltran MD

## 2019-10-07 NOTE — PROGRESS NOTES
"Becky Mock was seen in the Allergy Clinic at [facility]. The following are my recommendations regarding her Moderate Persistent Asthma and Chronic Rhinitis    1. Increase Dulera to 200/5mcg, 2 puffs twice daily  2. Consider addition of montelukast or Spiriva for persistent symptoms  3. Start prednisone 20mg twice daily x 5 days then 20mg daily x 5 days  4. Resume cetirizine 10mg daily  5. Take 2 to 4 puffs of albuterol HFA every 4 hours as needed  6. Will check specific IgE to seasonal and perennial aeroallergens  7. Follow-up in 4 to 6 weeks      Becky Mock is a 34 year old [place] female who is seen today for follow-up of asthma and chronic rhinitis. The history is obtained with the assistance of an . She reports that she went to urgent care 3 weeks ago for symptoms of shortness of breath and chest pain. She was given a nebulizer treatment, advised to continue her prescribed medications, and prescribed a medication \"for infection.\" Becky does not recall what specific medication was prescribed. Recently she has been taking oral ampicillin that was purchased overseas and given to her by a friend. Over the last 6 weeks she feels her symptoms have started to worsen. She has had occasional difficulty sleeping due to her symptoms. Her symptoms include nasal congestion, headaches, shortness of breath, chest pain, and back pain. Exposure to dust and chemical sprays worsen her symptoms. She has quit her job in the last few weeks as they were unable to provide accommodations to reduce her exposure to dust and cleaning chemicals.    Becky reports that she is taking her asthma medication every 2 to 3 hours. Upon further clarification she pointed to both her Dulera and albuterol inhalers and is taking both medications every 2 to 3 hours. The medications provide temporary relief of her symptoms. She is requesting a referral to pulmonology today.      Past Medical History:   Diagnosis Date     Normal puberty " menarche age    cycles q  x  d     Uncomplicated asthma      Family History   Problem Relation Age of Onset     Asthma Sister      Asthma Son      Diabetes No family hx of      Coronary Artery Disease No family hx of      Social History     Tobacco Use     Smoking status: Never Smoker     Smokeless tobacco: Never Used   Substance Use Topics     Alcohol use: No     Drug use: No       Past medical, family, and social history were reviewed.    REVIEW OF SYSTEMS:  General: negative for weight gain. negative for weight loss. negative for changes in sleep.   Eyes: negative for itching. negative for redness. negative for tearing/watering. negative for vision changes  Ears: negative for fullness. negative for hearing loss. negative for dizziness.   Nose: negative for snoring.negative for changes in smell. negative for drainage.   Throat: positive  for hoarseness. negative for sore throat. negative for trouble swallowing.   Lungs: positive  for cough. positive  for shortness of breath.positive  for wheezing. negative for sputum production.   Cardiovascular: positive  for chest pain. negative for swelling of ankles. negative for fast or irregular heartbeat.   Gastrointestinal: negative for nausea. negative for heartburn. negative for acid reflux.   Musculoskeletal: negative for joint pain. negative for joint stiffness. negative for joint swelling.   Neurologic: negative for seizures. negative for fainting. negative for weakness.   Psychiatric: negative for changes in mood. negative for anxiety.   Endocrine: positive  for cold intolerance. positive  for heat intolerance. negative for tremors.   Hematologic: negative for easy bruising. negative for easy bleeding.  Integumentary: positive  for rash. negative for scaling. negative for nail changes.       Current Outpatient Medications:      albuterol (PROAIR HFA/PROVENTIL HFA/VENTOLIN HFA) 108 (90 Base) MCG/ACT inhaler, Inhale 2 puffs into the lungs every 4 hours as needed  (cough, wheezing, shortness of breath) Also take 2 puffs 15 minutes prior to work, Disp: 1 Inhaler, Rfl: 3     fluticasone (FLONASE) 50 MCG/ACT nasal spray, Spray 2 sprays into both nostrils daily, Disp: 16 g, Rfl: 11     omeprazole (PRILOSEC) 20 MG DR capsule, TAKE 1 CAPSULE BY MOUTH EVERY DAY 30 MINUTES TO 1 HOUR BEFORE A MEAL, Disp: , Rfl: 0     AMOXAPINE PO, Take 500 mg by mouth 2 times daily, Disp: , Rfl:      azelastine (ASTELIN) 0.1 % nasal spray, Spray 2 sprays into both nostrils 2 times daily (Patient not taking: Reported on 10/7/2019), Disp: 1 Bottle, Rfl: 11     cetirizine (ZYRTEC) 10 MG tablet, Take 10 mg by mouth daily, Disp: , Rfl:      diphenhydrAMINE (BENADRYL) 25 MG capsule, Take 25 mg by mouth, Disp: , Rfl:      docusate sodium (COLACE) 100 MG tablet, Take 100 mg by mouth 2 times daily (Patient not taking: Reported on 7/8/2019), Disp: 60 tablet, Rfl: 3     mometasone-formoterol (DULERA) 100-5 MCG/ACT inhaler, Inhale 2 puffs into the lungs 2 times daily (Patient not taking: Reported on 10/7/2019), Disp: 1 Inhaler, Rfl: 1     naproxen (NAPROSYN) 375 MG tablet, Take 1 tablet (375 mg) by mouth 2 times daily (with meals) (Patient not taking: Reported on 7/8/2019), Disp: 60 tablet, Rfl: 2     neomycin-polymyxin-hydrocortisone (CORTISPORIN) otic solution, Place 3 drops in ear(s) 4 times daily, Disp: , Rfl:      ranitidine (ZANTAC) 150 MG tablet, Take 1 tablet (150 mg) by mouth 2 times daily (Patient not taking: Reported on 7/8/2019), Disp: 60 tablet, Rfl: 3    EXAM:   /68 (BP Location: Left arm, Patient Position: Sitting, Cuff Size: Adult Regular)   Pulse 85   Wt 70.1 kg (154 lb 9.6 oz)   SpO2 97%   BMI 26.07 kg/m    GENERAL APPEARANCE: alert, cooperative and not in distress  SKIN: no rashes, no lesions  HEAD: atraumatic, normocephalic  EYES: lids and lashes normal, conjunctivae and sclerae clear  ENT: no scars or lesions, nasal exam showed no discharge, swelling or lesions noted, tongue midline  and normal, soft palate, uvula, and tonsils normal  NECK: no asymmetry, masses, or scars, supple without significant adenopathy  LUNGS: unlabored respirations, no intercostal retractions or accessory muscle use, clear to auscultation without rales or wheezes  HEART: regular rate and rhythm without murmurs and normal S1 and S2  MUSCULOSKELETAL: no musculoskeletal defects are noted  NEURO: no focal deficits noted  PSYCH: does not appear depressed or anxious      WORKUP:  None    ASSESSMENT/PLAN:  Becky Mock is a 34 year old female here for follow-up of asthma and chronic rhinitis. Over the past 6 weeks she feels her symptoms have been worsening despite frequently using her inhalers. Her initial evaluation, including labs and spirometry, has been unremarkable. Spirometry has been normal and does not show signs of airflow obstruction. We reviewed her currently prescribed medications and appropriate dosing and use. We discussed addition of LTRA and LAMA therapy in addition to ICS/LABA therapy however she did not wish to add new medications today.    1. Increase Dulera to 200/5mcg, 2 puffs twice daily  2. Consider addition of montelukast or Spiriva for persistent symptoms  3. Start prednisone 20mg twice daily x 5 days then 20mg daily x 5 days  4. Resume cetirizine 10mg daily  5. Take 2 to 4 puffs of albuterol HFA every 4 hours as needed  6. Will check specific IgE to seasonal and perennial aeroallergens  7. Follow-up in 4 to 6 weeks      Thank you for allowing me to participate in the care of Becky Mock.      Kirk Beltran MD  Allergy/Immunology  [facility] and [place], [place]      Chart documentation done in part with Dragon Voice Recognition Software. Although reviewed after completion, some word and grammatical errors may remain.

## 2019-10-09 LAB
A ALTERNATA IGE QN: <0.1 KU(A)/L
A FUMIGATUS IGE QN: <0.1 KU(A)/L
C HERBARUM IGE QN: <0.1 KU(A)/L
CALIF WALNUT IGE QN: 0.13 KU/L
CAT DANDER IGG QN: <0.1 KU(A)/L
CEDAR IGE QN: <0.1 KU(A)/L
COCKSFOOT IGE QN: 0.14 KU(A)/L
COMMON RAGWEED IGE QN: 0.16 KU(A)/L
COTTONWOOD IGE QN: 0.14 KU(A)/L
D FARINAE IGE QN: 0.19 KU(A)/L
D PTERONYSS IGE QN: 0.16 KU(A)/L
DEPRECATED MISC ALLERGEN IGE RAST QL: NORMAL
DOG DANDER+EPITH IGE QN: <0.1 KU(A)/L
E PURPURASCENS IGE QN: <0.1 KU(A)/L
EAST WHITE PINE IGE QN: <0.1 KU(A)/L
ENGL PLANTAIN IGE QN: 0.16 KU(A)/L
FIREBUSH IGE QN: 0.12 KU(A)/L
GIANT RAGWEED IGE QN: 0.14 KU(A)/L
GOOSEFOOT IGE QN: 0.14 KU(A)/L
JOHNSON GRASS IGE QN: 0.15 KU(A)/L
MAPLE IGE QN: 0.11 KU(A)/L
MUGWORT IGE QN: <0.1 KU(A)/L
NETTLE IGE QN: <0.1 KU(A)/L
P NOTATUM IGE QN: <0.1 KU(A)/L
RED MULBERRY IGE QN: <0.1 KU(A)/L
SALTWORT IGE QN: 0.13 KU(A)/L
SHEEP SORREL IGE QN: 0.14 KU(A)/L
SILVER BIRCH IGE QN: <0.1 KU(A)/L
TIMOTHY IGE QN: 0.14 KU(A)/L
WHITE ASH IGE QN: 0.13 KU(A)/L
WHITE ELM IGE QN: 0.13 KU(A)/L
WHITE MULBERRY IGE QN: <0.1 KU(A)/L
WHITE OAK IGE QN: 0.15 KU(A)/L
WORMWOOD IGE QN: 0.11 KU(A)/L

## 2019-10-13 ENCOUNTER — TELEPHONE (OUTPATIENT)
Dept: ALLERGY | Facility: CLINIC | Age: 34
End: 2019-10-13

## 2019-10-13 NOTE — TELEPHONE ENCOUNTER
Please call patient via [employer]  regarding lab results. Testing showed mildly positive results for allergies to dust mite, trees, grass, and weeds. Recommend she continue with medications as prescribed, schedule evaluation with pulmonology, and follow-up in 4-6 weeks.

## 2019-10-14 NOTE — TELEPHONE ENCOUNTER
RN spoke with patient regarding lab results. Patient verbalized understanding. No further questions or concerns. Provided patient with phone number to schedule appointment with pulmonology.    Fabiola Hill RN

## 2020-06-09 ENCOUNTER — APPOINTMENT (OUTPATIENT)
Dept: INTERPRETER SERVICES | Facility: CLINIC | Age: 35
End: 2020-06-09
Payer: COMMERCIAL

## 2020-06-25 ENCOUNTER — VIRTUAL VISIT (OUTPATIENT)
Dept: ALLERGY | Facility: CLINIC | Age: 35
End: 2020-06-25
Payer: COMMERCIAL

## 2020-06-25 VITALS — BODY MASS INDEX: 25.97 KG/M2 | WEIGHT: 154 LBS

## 2020-06-25 DIAGNOSIS — K21.9 GASTROESOPHAGEAL REFLUX DISEASE, ESOPHAGITIS PRESENCE NOT SPECIFIED: ICD-10-CM

## 2020-06-25 DIAGNOSIS — K59.00 CONSTIPATION, UNSPECIFIED CONSTIPATION TYPE: Primary | ICD-10-CM

## 2020-06-25 PROCEDURE — 99214 OFFICE O/P EST MOD 30 MIN: CPT | Mod: 95 | Performed by: ALLERGY & IMMUNOLOGY

## 2020-06-25 RX ORDER — POLYETHYLENE GLYCOL 3350 17 G/17G
1 POWDER, FOR SOLUTION ORAL 2 TIMES DAILY
Qty: 1020 G | Refills: 3 | Status: SHIPPED | OUTPATIENT
Start: 2020-06-25

## 2020-06-25 RX ORDER — PANTOPRAZOLE SODIUM 40 MG/1
40 TABLET, DELAYED RELEASE ORAL DAILY
Qty: 30 TABLET | Refills: 3 | Status: SHIPPED | OUTPATIENT
Start: 2020-06-25

## 2020-06-25 RX ORDER — MONTELUKAST SODIUM 10 MG/1
10 TABLET ORAL
COMMUNITY
Start: 2020-05-08

## 2020-06-25 ASSESSMENT — ASTHMA QUESTIONNAIRES
QUESTION_3 LAST FOUR WEEKS HOW OFTEN DID YOUR ASTHMA SYMPTOMS (WHEEZING, COUGHING, SHORTNESS OF BREATH, CHEST TIGHTNESS OR PAIN) WAKE YOU UP AT NIGHT OR EARLIER THAN USUAL IN THE MORNING: FOUR OR MORE NIGHTS A WEEK
ACT_TOTALSCORE: 14
QUESTION_2 LAST FOUR WEEKS HOW OFTEN HAVE YOU HAD SHORTNESS OF BREATH: THREE TO SIX TIMES A WEEK
QUESTION_4 LAST FOUR WEEKS HOW OFTEN HAVE YOU USED YOUR RESCUE INHALER OR NEBULIZER MEDICATION (SUCH AS ALBUTEROL): ONCE A WEEK OR LESS
ACT_TOTALSCORE: 14
EMERGENCY_ROOM_LAST_YEAR_TOTAL: TWO
QUESTION_5 LAST FOUR WEEKS HOW WOULD YOU RATE YOUR ASTHMA CONTROL: SOMEWHAT CONTROLLED
EMERGENCY_ROOM_LAST_YEAR_TOTAL: TWO
QUESTION_3 LAST FOUR WEEKS HOW OFTEN DID YOUR ASTHMA SYMPTOMS (WHEEZING, COUGHING, SHORTNESS OF BREATH, CHEST TIGHTNESS OR PAIN) WAKE YOU UP AT NIGHT OR EARLIER THAN USUAL IN THE MORNING: FOUR OR MORE NIGHTS A WEEK
QUESTION_5 LAST FOUR WEEKS HOW WOULD YOU RATE YOUR ASTHMA CONTROL: SOMEWHAT CONTROLLED
QUESTION_1 LAST FOUR WEEKS HOW MUCH OF THE TIME DID YOUR ASTHMA KEEP YOU FROM GETTING AS MUCH DONE AT WORK, SCHOOL OR AT HOME: SOME OF THE TIME
QUESTION_1 LAST FOUR WEEKS HOW MUCH OF THE TIME DID YOUR ASTHMA KEEP YOU FROM GETTING AS MUCH DONE AT WORK, SCHOOL OR AT HOME: SOME OF THE TIME
QUESTION_4 LAST FOUR WEEKS HOW OFTEN HAVE YOU USED YOUR RESCUE INHALER OR NEBULIZER MEDICATION (SUCH AS ALBUTEROL): ONCE A WEEK OR LESS
QUESTION_2 LAST FOUR WEEKS HOW OFTEN HAVE YOU HAD SHORTNESS OF BREATH: THREE TO SIX TIMES A WEEK

## 2020-06-25 NOTE — PATIENT INSTRUCTIONS
If you have any questions regarding your allergies, asthma, or what we discussed during your visit today please call the allergy clinic or contact us via [employer].    [facility] RN Line: [phone]  [facility] Line: [phone]

## 2020-06-25 NOTE — LETTER
"    6/25/2020         RE: Becky Mock  [address]  [facility] [address]        Dear Colleague,    Thank you for referring your patient, Becky Mock, to the [facility]. Please see a copy of my visit note below.    Becky Mock is a 35 year old female who is being evaluated via a billable telephone visit.      The patient has been notified of following:     \"This telephone visit will be conducted via a call between you and your physician/provider. We have found that certain health care needs can be provided without the need for a physical exam.  This service lets us provide the care you need with a short phone conversation.  If a prescription is necessary we can send it directly to your pharmacy.  If lab work is needed we can place an order for that and you can then stop by our lab to have the test done at a later time.    Telephone visits are billed at different rates depending on your insurance coverage. During this emergency period, for some insurers they may be billed the same as an in-person visit.  Please reach out to your insurance provider with any questions.    If during the course of the call the physician/provider feels a telephone visit is not appropriate, you will not be charged for this service.\"    Patient has given verbal consent for Telephone visit?  Yes    What phone number would you like to be contacted at? [phone]     How would you like to obtain your AVS? Mail a copy    Phone call duration: 17 minutes, Start 11:44, End 12:01    Becky Mock was seen in the Allergy Clinic at [facility].      Becky Mock is a 35 year old [place] female who is seen today for follow-up of shortness of breath. The history was obtained with the assistance of an  via telephone. Her main concerns today are symptoms of heartburn, poor appetite, bloating, and constipation. She was supposed to have an endoscopy and colonoscopy last summer however this was cancelled as she " reported symptoms of shortness of breath when she presented for the procedure. Becky states that she feels her breathing issues only arise when she is having digestive and gastrointestinal issus. She was seen at an outside clinic in May and June and prescribed montelukast and albuterol for symptoms of shortness of breath and wheezing. Today, Becky expressed that she is currently concerned about her gastrointestinal symptoms. She has had a very poor appetite and when she does eat she feels bloated. She has also had frequent symptoms of heartburn. She purchased an OTC medication for heartburn and in the past had been taking a medication for heartburn daily. Now she is taking the medicaton only as needed. She is not taking any other medications that were prescribed by her gastroenterologist.    Becky denied having concerns regarding her breathing at this time and stated that she is not currently experiencing cough, shortness of breath, or wheezing. She has been taking the montelukast daily.      Past Medical History:   Diagnosis Date     Normal puberty menarche age    cycles q  x  d     Uncomplicated asthma      Family History   Problem Relation Age of Onset     Asthma Sister      Asthma Son      Diabetes No family hx of      Coronary Artery Disease No family hx of      Social History     Tobacco Use     Smoking status: Never Smoker     Smokeless tobacco: Never Used   Substance Use Topics     Alcohol use: No     Drug use: No     Social History     Social History Narrative     Not on file       Past medical, family, and social history were reviewed.    REVIEW OF SYSTEMS:  General: negative for weight gain. negative for weight loss. negative for changes in sleep.   Ears: negative for fullness. negative for hearing loss. negative for dizziness.   Nose: negative for snoring.negative for changes in smell. negative for drainage.   Eyes: negative for eye watering. negative for eye itching. negative for vision changes. negative  for eye redness.  Throat: negative for hoarseness. negative for sore throat. negative for trouble swallowing.   Lungs: Positive for shortness of breath.positive  for wheezing. positive  for sputum production.   Cardiovascular: negative for chest pain. negative for swelling of ankles. negative for fast or irregular heartbeat.   Gastrointestinal: negative for nausea. negative for heartburn. negative for acid reflux.   Musculoskeletal: negative for joint pain. negative for joint stiffness. negative for joint swelling.   Neurologic: negative for seizures. negative for fainting. negative for weakness.   Psychiatric: negative for changes in mood. negative for anxiety.   Endocrine: negative for cold intolerance. negative for heat intolerance. negative for tremors.   Lymphatic: negative for lower extremity swelling. negative for lymph node swelling.   Hematologic: negative for easy bruising. negative for easy bleeding.  Integumentary: negative for rash. negative for scaling. negative for nail changes.         Current Outpatient Medications:      cetirizine (ZYRTEC) 10 MG tablet, Take 1 tablet (10 mg) by mouth daily, Disp: 30 tablet, Rfl: 11     diphenhydrAMINE (BENADRYL) 25 MG capsule, Take 25 mg by mouth, Disp: , Rfl:      docusate sodium (COLACE) 100 MG tablet, Take 100 mg by mouth 2 times daily, Disp: 60 tablet, Rfl: 3     montelukast (SINGULAIR) 10 MG tablet, Take 10 mg by mouth, Disp: , Rfl:      naproxen (NAPROSYN) 375 MG tablet, Take 1 tablet (375 mg) by mouth 2 times daily (with meals), Disp: 60 tablet, Rfl: 2     albuterol (PROAIR HFA/PROVENTIL HFA/VENTOLIN HFA) 108 (90 Base) MCG/ACT inhaler, Inhale 2 puffs into the lungs every 4 hours as needed (cough, wheezing, shortness of breath) Also take 2 puffs 15 minutes prior to work (Patient not taking: Reported on 6/25/2020), Disp: 1 Inhaler, Rfl: 3     AMOXAPINE PO, Take 500 mg by mouth 2 times daily, Disp: , Rfl:      azelastine (ASTELIN) 0.1 % nasal spray, Spray 2  sprays into both nostrils 2 times daily (Patient not taking: Reported on 6/25/2020), Disp: 1 Bottle, Rfl: 11     fluticasone (FLONASE) 50 MCG/ACT nasal spray, Spray 2 sprays into both nostrils daily (Patient not taking: Reported on 6/25/2020), Disp: 16 g, Rfl: 11     mometasone-formoterol (DULERA) 200-5 MCG/ACT inhaler, Inhale 2 puffs into the lungs 2 times daily (Patient not taking: Reported on 6/25/2020), Disp: 1 Inhaler, Rfl: 1     neomycin-polymyxin-hydrocortisone (CORTISPORIN) otic solution, Place 3 drops in ear(s) 4 times daily, Disp: , Rfl:      omeprazole (PRILOSEC) 20 MG DR capsule, TAKE 1 CAPSULE BY MOUTH EVERY DAY 30 MINUTES TO 1 HOUR BEFORE A MEAL, Disp: , Rfl: 0     predniSONE (DELTASONE) 20 MG tablet, Take 20mg twice daily x 5 days, then 20mg daily x 5 days, then stop (Patient not taking: Reported on 6/25/2020), Disp: 15 tablet, Rfl: 0     ranitidine (ZANTAC) 150 MG tablet, Take 1 tablet (150 mg) by mouth 2 times daily (Patient not taking: Reported on 6/25/2020), Disp: 60 tablet, Rfl: 3    EXAM:   Wt 69.9 kg (154 lb)   BMI 25.97 kg/m    GENERAL: alert, cooperative and not in distress  LUNGS: speaking comfortably in full sentences, no cough or audible wheezing  NEURO: oriented for age x3  PSYCH: does not appear depressed or anxious      WORKUP:  None    ASSESSMENT/PLAN:  Becky Mock is a 35 year old female seen today for a follow-up visit.    1. Constipation, unspecified constipation type - Previous records from [facility] were reviewed. She was scheduled to have endoscopy and colonoscopy however this procedure was cancelled. She was advised to call the clinic to discuss whether the procedure can be rescheduled at this time.     - polyethylene glycol (MIRALAX) 17 GM/SCOOP powder; Take 17 g (1 capful) by mouth 2 times daily  Dispense: 1020 g; Refill: 3  - encouraged increased fluid intake to help with constipation  -advised to follow-up with gastroenterology for ongoing management and to  reschedule upper endoscopy and colonoscopy    2. Gastroesophageal reflux disease, esophagitis presence not specified    - pantoprazole (PROTONIX) 40 MG EC tablet; Take 1 tablet (40 mg) by mouth daily  Dispense: 30 tablet; Refill: 3      Follow-up in the allergy clinic as needed      Thank you for allowing me to participate in the care of Becky Mock.      Kirk Beltran MD, [facility]  Allergy/Immunology  [facility]'s      Chart documentation done in part with Dragon Voice Recognition Software. Although reviewed after completion, some word and grammatical errors may remain.    Again, thank you for allowing me to participate in the care of your patient.        Sincerely,        Kirk Beltran MD

## 2020-06-25 NOTE — PROGRESS NOTES
"Becky Mock is a 35 year old female who is being evaluated via a billable telephone visit.      The patient has been notified of following:     \"This telephone visit will be conducted via a call between you and your physician/provider. We have found that certain health care needs can be provided without the need for a physical exam.  This service lets us provide the care you need with a short phone conversation.  If a prescription is necessary we can send it directly to your pharmacy.  If lab work is needed we can place an order for that and you can then stop by our lab to have the test done at a later time.    Telephone visits are billed at different rates depending on your insurance coverage. During this emergency period, for some insurers they may be billed the same as an in-person visit.  Please reach out to your insurance provider with any questions.    If during the course of the call the physician/provider feels a telephone visit is not appropriate, you will not be charged for this service.\"    Patient has given verbal consent for Telephone visit?  Yes    What phone number would you like to be contacted at? [phone]     How would you like to obtain your AVS? Mail a copy    Phone call duration: 17 minutes, Start 11:44, End 12:01    Becky Mock was seen in the Allergy Clinic at [facility].      Becky Mock is a 35 year old [place] female who is seen today for follow-up of shortness of breath. The history was obtained with the assistance of an  via telephone. Her main concerns today are symptoms of heartburn, poor appetite, bloating, and constipation. She was supposed to have an endoscopy and colonoscopy last summer however this was cancelled as she reported symptoms of shortness of breath when she presented for the procedure. Becky states that she feels her breathing issues only arise when she is having digestive and gastrointestinal issus. She was seen at an outside clinic in May and June " and prescribed montelukast and albuterol for symptoms of shortness of breath and wheezing. Today, Becky expressed that she is currently concerned about her gastrointestinal symptoms. She has had a very poor appetite and when she does eat she feels bloated. She has also had frequent symptoms of heartburn. She purchased an OTC medication for heartburn and in the past had been taking a medication for heartburn daily. Now she is taking the medicaton only as needed. She is not taking any other medications that were prescribed by her gastroenterologist.    Becky denied having concerns regarding her breathing at this time and stated that she is not currently experiencing cough, shortness of breath, or wheezing. She has been taking the montelukast daily.      Past Medical History:   Diagnosis Date     Normal puberty menarche age    cycles q  x  d     Uncomplicated asthma      Family History   Problem Relation Age of Onset     Asthma Sister      Asthma Son      Diabetes No family hx of      Coronary Artery Disease No family hx of      Social History     Tobacco Use     Smoking status: Never Smoker     Smokeless tobacco: Never Used   Substance Use Topics     Alcohol use: No     Drug use: No     Social History     Social History Narrative     Not on file       Past medical, family, and social history were reviewed.    REVIEW OF SYSTEMS:  General: negative for weight gain. negative for weight loss. negative for changes in sleep.   Ears: negative for fullness. negative for hearing loss. negative for dizziness.   Nose: negative for snoring.negative for changes in smell. negative for drainage.   Eyes: negative for eye watering. negative for eye itching. negative for vision changes. negative for eye redness.  Throat: negative for hoarseness. negative for sore throat. negative for trouble swallowing.   Lungs: Positive for shortness of breath.positive  for wheezing. positive  for sputum production.   Cardiovascular: negative for chest  pain. negative for swelling of ankles. negative for fast or irregular heartbeat.   Gastrointestinal: negative for nausea. negative for heartburn. negative for acid reflux.   Musculoskeletal: negative for joint pain. negative for joint stiffness. negative for joint swelling.   Neurologic: negative for seizures. negative for fainting. negative for weakness.   Psychiatric: negative for changes in mood. negative for anxiety.   Endocrine: negative for cold intolerance. negative for heat intolerance. negative for tremors.   Lymphatic: negative for lower extremity swelling. negative for lymph node swelling.   Hematologic: negative for easy bruising. negative for easy bleeding.  Integumentary: negative for rash. negative for scaling. negative for nail changes.         Current Outpatient Medications:      cetirizine (ZYRTEC) 10 MG tablet, Take 1 tablet (10 mg) by mouth daily, Disp: 30 tablet, Rfl: 11     diphenhydrAMINE (BENADRYL) 25 MG capsule, Take 25 mg by mouth, Disp: , Rfl:      docusate sodium (COLACE) 100 MG tablet, Take 100 mg by mouth 2 times daily, Disp: 60 tablet, Rfl: 3     montelukast (SINGULAIR) 10 MG tablet, Take 10 mg by mouth, Disp: , Rfl:      naproxen (NAPROSYN) 375 MG tablet, Take 1 tablet (375 mg) by mouth 2 times daily (with meals), Disp: 60 tablet, Rfl: 2     albuterol (PROAIR HFA/PROVENTIL HFA/VENTOLIN HFA) 108 (90 Base) MCG/ACT inhaler, Inhale 2 puffs into the lungs every 4 hours as needed (cough, wheezing, shortness of breath) Also take 2 puffs 15 minutes prior to work (Patient not taking: Reported on 6/25/2020), Disp: 1 Inhaler, Rfl: 3     AMOXAPINE PO, Take 500 mg by mouth 2 times daily, Disp: , Rfl:      azelastine (ASTELIN) 0.1 % nasal spray, Spray 2 sprays into both nostrils 2 times daily (Patient not taking: Reported on 6/25/2020), Disp: 1 Bottle, Rfl: 11     fluticasone (FLONASE) 50 MCG/ACT nasal spray, Spray 2 sprays into both nostrils daily (Patient not taking: Reported on 6/25/2020), Disp:  16 g, Rfl: 11     mometasone-formoterol (DULERA) 200-5 MCG/ACT inhaler, Inhale 2 puffs into the lungs 2 times daily (Patient not taking: Reported on 6/25/2020), Disp: 1 Inhaler, Rfl: 1     neomycin-polymyxin-hydrocortisone (CORTISPORIN) otic solution, Place 3 drops in ear(s) 4 times daily, Disp: , Rfl:      omeprazole (PRILOSEC) 20 MG DR capsule, TAKE 1 CAPSULE BY MOUTH EVERY DAY 30 MINUTES TO 1 HOUR BEFORE A MEAL, Disp: , Rfl: 0     predniSONE (DELTASONE) 20 MG tablet, Take 20mg twice daily x 5 days, then 20mg daily x 5 days, then stop (Patient not taking: Reported on 6/25/2020), Disp: 15 tablet, Rfl: 0     ranitidine (ZANTAC) 150 MG tablet, Take 1 tablet (150 mg) by mouth 2 times daily (Patient not taking: Reported on 6/25/2020), Disp: 60 tablet, Rfl: 3    EXAM:   Wt 69.9 kg (154 lb)   BMI 25.97 kg/m    GENERAL: alert, cooperative and not in distress  LUNGS: speaking comfortably in full sentences, no cough or audible wheezing  NEURO: oriented for age x3  PSYCH: does not appear depressed or anxious      WORKUP:  None    ASSESSMENT/PLAN:  Becky Mock is a 35 year old female seen today for a follow-up visit.    1. Constipation, unspecified constipation type - Previous records from [facility] were reviewed. She was scheduled to have endoscopy and colonoscopy however this procedure was cancelled. She was advised to call the clinic to discuss whether the procedure can be rescheduled at this time.     - polyethylene glycol (MIRALAX) 17 GM/SCOOP powder; Take 17 g (1 capful) by mouth 2 times daily  Dispense: 1020 g; Refill: 3  - encouraged increased fluid intake to help with constipation  -advised to follow-up with gastroenterology for ongoing management and to reschedule upper endoscopy and colonoscopy    2. Gastroesophageal reflux disease, esophagitis presence not specified    - pantoprazole (PROTONIX) 40 MG EC tablet; Take 1 tablet (40 mg) by mouth daily  Dispense: 30 tablet; Refill: 3      Follow-up in the  allergy clinic as needed      Thank you for allowing me to participate in the care of Becky Mock.      Kirk Beltran MD, FAAAAI  Allergy/Immunology  [facility]'s      Chart documentation done in part with Dragon Voice Recognition Software. Although reviewed after completion, some word and grammatical errors may remain.

## 2020-06-26 ASSESSMENT — ASTHMA QUESTIONNAIRES: ACT_TOTALSCORE: 14

## 2021-08-19 ENCOUNTER — HOSPITAL ENCOUNTER (EMERGENCY)
Facility: CLINIC | Age: 36
Discharge: HOME | End: 2021-08-19
Attending: EMERGENCY MEDICINE | Admitting: EMERGENCY MEDICINE
Payer: COMMERCIAL

## 2021-08-19 ENCOUNTER — APPOINTMENT (OUTPATIENT)
Dept: GENERAL RADIOLOGY | Facility: CLINIC | Age: 36
End: 2021-08-19
Attending: EMERGENCY MEDICINE
Payer: COMMERCIAL

## 2021-08-19 VITALS
HEART RATE: 76 BPM | OXYGEN SATURATION: 100 % | DIASTOLIC BLOOD PRESSURE: 65 MMHG | SYSTOLIC BLOOD PRESSURE: 105 MMHG | RESPIRATION RATE: 18 BRPM

## 2021-08-19 DIAGNOSIS — R06.02 SHORTNESS OF BREATH: ICD-10-CM

## 2021-08-19 DIAGNOSIS — R05.9 COUGH: ICD-10-CM

## 2021-08-19 LAB
HCG UR QL: NEGATIVE
INTERNAL QC OK POCT: NORMAL

## 2021-08-19 PROCEDURE — 99284 EMERGENCY DEPT VISIT MOD MDM: CPT | Mod: 25 | Performed by: EMERGENCY MEDICINE

## 2021-08-19 PROCEDURE — 81025 URINE PREGNANCY TEST: CPT | Performed by: EMERGENCY MEDICINE

## 2021-08-19 PROCEDURE — 99284 EMERGENCY DEPT VISIT MOD MDM: CPT | Performed by: EMERGENCY MEDICINE

## 2021-08-19 PROCEDURE — 94640 AIRWAY INHALATION TREATMENT: CPT | Performed by: EMERGENCY MEDICINE

## 2021-08-19 PROCEDURE — 250N000013 HC RX MED GY IP 250 OP 250 PS 637: Performed by: EMERGENCY MEDICINE

## 2021-08-19 PROCEDURE — 71046 X-RAY EXAM CHEST 2 VIEWS: CPT

## 2021-08-19 PROCEDURE — 250N000009 HC RX 250: Performed by: EMERGENCY MEDICINE

## 2021-08-19 RX ORDER — IPRATROPIUM BROMIDE AND ALBUTEROL SULFATE 2.5; .5 MG/3ML; MG/3ML
3 SOLUTION RESPIRATORY (INHALATION)
Status: COMPLETED | OUTPATIENT
Start: 2021-08-19 | End: 2021-08-19

## 2021-08-19 RX ORDER — BENZONATATE 200 MG/1
200 CAPSULE ORAL 3 TIMES DAILY PRN
Qty: 21 CAPSULE | Refills: 0 | Status: SHIPPED | OUTPATIENT
Start: 2021-08-19

## 2021-08-19 RX ORDER — IBUPROFEN 600 MG/1
600 TABLET, FILM COATED ORAL ONCE
Status: COMPLETED | OUTPATIENT
Start: 2021-08-19 | End: 2021-08-19

## 2021-08-19 RX ORDER — PREDNISONE 20 MG/1
TABLET ORAL
Qty: 10 TABLET | Refills: 0 | Status: SHIPPED | OUTPATIENT
Start: 2021-08-19

## 2021-08-19 RX ORDER — BENZONATATE 200 MG/1
200 CAPSULE ORAL 3 TIMES DAILY PRN
Qty: 21 CAPSULE | Refills: 0 | Status: SHIPPED | OUTPATIENT
Start: 2021-08-19 | End: 2021-08-19

## 2021-08-19 RX ADMIN — IBUPROFEN 600 MG: 600 TABLET ORAL at 06:33

## 2021-08-19 RX ADMIN — IPRATROPIUM BROMIDE AND ALBUTEROL SULFATE 3 ML: .5; 3 SOLUTION RESPIRATORY (INHALATION) at 03:05

## 2021-08-19 ASSESSMENT — ENCOUNTER SYMPTOMS
CONFUSION: 0
FEVER: 0
SHORTNESS OF BREATH: 1
BRUISES/BLEEDS EASILY: 0
PALPITATIONS: 0
BACK PAIN: 0
DIZZINESS: 0
DYSURIA: 0
COUGH: 0
CHILLS: 0
ABDOMINAL PAIN: 0
WEAKNESS: 0

## 2021-08-19 NOTE — DISCHARGE INSTRUCTIONS
Thank you for your patience today.  Please follow-up with your regular doctor in the next 2-3 days for further evaluation and follow-up care.  Please call to schedule an appointment.  Please continue your own medications.  Please continue your inhaler as needed for shortness of breath and cough.  Please take steroids and cough medication as directed.  We recommend over-the-counter stool softener and/or laxative such as Senna, Senna-s, or Miralax daily to help with your bowel movements.  Please return to the ER if you develop persistent high fever, difficulty breathing, or any worsening of your current symptoms.  It was a pleasure taking care of you today.  We hope you feel better soon.

## 2021-08-19 NOTE — ED PROVIDER NOTES
ED Provider Note  [facility]      History     Chief Complaint   Patient presents with     Shortness of Breath     Pt states to have difficulty breathing, was at [facility] earlier, pt states to have a history of asthma (has not used inhaler)     HPI  Becky Mock is a 36 year old female who has a past medical history of asthma who presents to the ED from home with c/o of shortness of breath. Patient c/o of shortness of breath and difficulty breathing over the past 2 weeks.  Patient reports that she has been feeling unwell with dry cough, body aches, joint pains, and subjective fevers at home.  Patient does report occasional chest pain and shortness of breath that is associated when coughing.  Patient also reports difficulty sleeping due to the pain and feeling unwell.  Patient denies any weakness, dizziness, nausea, vomiting, abdominal pain.  Patient reports that she does have an inhaler but has not used it and does not think this would help.  Patient also complains of some back pain that has been ongoing since her injury in 2010.  No other new injury or trauma.  No other complaints.      Past Medical History  Past Medical History:   Diagnosis Date     Normal puberty menarche age    cycles q  x  d     Uncomplicated asthma      Past Surgical History:   Procedure Laterality Date     NO HISTORY OF SURGERY       benzonatate (TESSALON) 200 MG capsule  predniSONE (DELTASONE) 20 MG tablet  albuterol (PROAIR HFA/PROVENTIL HFA/VENTOLIN HFA) 108 (90 Base) MCG/ACT inhaler  AMOXAPINE PO  azelastine (ASTELIN) 0.1 % nasal spray  cetirizine (ZYRTEC) 10 MG tablet  diphenhydrAMINE (BENADRYL) 25 MG capsule  docusate sodium (COLACE) 100 MG tablet  fluticasone (FLONASE) 50 MCG/ACT nasal spray  mometasone-formoterol (DULERA) 200-5 MCG/ACT inhaler  montelukast (SINGULAIR) 10 MG tablet  naproxen (NAPROSYN) 375 MG tablet  neomycin-polymyxin-hydrocortisone (CORTISPORIN) otic solution  pantoprazole (PROTONIX) 40 MG EC  tablet  polyethylene glycol (MIRALAX) 17 GM/SCOOP powder      No Known Allergies  Family History  Family History   Problem Relation Age of Onset     Asthma Sister      Asthma Son      Diabetes No family hx of      Coronary Artery Disease No family hx of      Social History   Social History     Tobacco Use     Smoking status: Never Smoker     Smokeless tobacco: Never Used   Substance Use Topics     Alcohol use: No     Drug use: No      Past medical history, past surgical history, medications, allergies, family history, and social history were reviewed with the patient. No additional pertinent items.       Review of Systems   Constitutional: Negative for chills and fever.   HENT: Negative for congestion.    Eyes: Negative for visual disturbance.   Respiratory: Positive for shortness of breath. Negative for cough.    Cardiovascular: Negative for chest pain, palpitations and leg swelling.   Gastrointestinal: Negative for abdominal pain.   Endocrine: Negative for polyuria.   Genitourinary: Negative for dysuria.   Musculoskeletal: Negative for back pain.   Skin: Negative for rash.   Allergic/Immunologic: Negative for immunocompromised state.   Neurological: Negative for dizziness and weakness.   Hematological: Does not bruise/bleed easily.   Psychiatric/Behavioral: Negative for confusion.       Physical Exam   BP: 113/79  Pulse: 84  Resp: 18  SpO2: 100 %  Physical Exam  General: Afebrile, no acute distress   HEENT: Normocephalic, atraumatic, conjunctivae normal. MMM  Neck: non-tender, supple  Cardio: regular rate. regular rhythm   Resp: Normal work of breathing, no respiratory distress, lungs clear bilaterally, no wheezing, rhonchi, rales  Chest/Back: no visual signs of trauma, no CVA tenderness   Abdomen: soft, non distension, no tenderness, no peritoneal signs   Neuro: alert and fully oriented. CN II-XII grossly intact. Grossly normal strength and sensation in all extremities.   MSK: no deformities. Normal range of  motion  Integumentary/Skin: no rash visualized, normal color  Psych: normal affect, normal behavior    ED Course      Procedures       Results for orders placed or performed during the hospital encounter of 08/19/21   XR Chest 2 Views     Status: None    Narrative    EXAM: XR CHEST 2 VW  LOCATION: [facility]  DATE/TIME: 8/19/2021 5:13 AM    INDICATION: Shortness of breath.  COMPARISON: None.      Impression    IMPRESSION: Negative chest.   hCG qual urine POCT     Status: None   Result Value Ref Range    HCG Qual Urine Negative Negative    Internal QC Check POCT Valid Valid     Medications   ipratropium - albuterol 0.5 mg/2.5 mg/3 mL (DUONEB) neb solution 3 mL (3 mLs Nebulization Given 8/19/21 0305)        Assessments & Plan (with Medical Decision Making)   Becky Mock is a 36 year old female who has a past medical history of asthma who presents to the ED from home with c/o of shortness of breath.  Upon arrival patient is well-appearing, afebrile, no distress.  Patient is nontoxic-appearing, no tachycardia, no hypoxia, no respiratory distress, lungs with minimal wheezing and coarse sounds throughout.  Differential diagnosis includes but is not limited to asthma versus viral upper respiratory infection versus bronchitis versus pneumonia versus Covid among others.    Covid test negative, I reviewed x-ray which is unremarkable with no focal infiltrate, pleural effusion, pneumothorax.  At this time I suspect likely viral illness, recommend continue supportive care at home with Tylenol and ibuprofen as needed for fever pain, continue inhaler for shortness of breath, will place patient on a short course of steroids as well as Tessalon Perles to help with patient's symptoms.  Encourage close follow-up with her primary care provider return precautions discussed if persistent high fever, difficulty breathing, or worsening symptoms.  Patient understands and agrees the plan.    I  have reviewed the nursing notes. I have reviewed the findings, diagnosis, plan and need for follow up with the patient.    New Prescriptions    BENZONATATE (TESSALON) 200 MG CAPSULE    Take 1 capsule (200 mg) by mouth 3 times daily as needed for cough    PREDNISONE (DELTASONE) 20 MG TABLET    Take two tablets (= 40mg) each day for 5 (five) days       Final diagnoses:   Shortness of breath   Cough       --  Petra Kerr MD  formerly [facility] EMERGENCY DEPARTMENT  8/19/2021     Petra Kerr MD  08/19/21 0615

## 2021-08-19 NOTE — LETTER
August 19, 2021      To Whom It May Concern:      Becky Mock was seen in our Emergency Department today, 08/19/21.She may return to work/school when improved.    Sincerely,        Petra Kerr MD